# Patient Record
Sex: FEMALE | Race: WHITE | Employment: PART TIME
[De-identification: names, ages, dates, MRNs, and addresses within clinical notes are randomized per-mention and may not be internally consistent; named-entity substitution may affect disease eponyms.]

---
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
## 2023-01-22 RX ORDER — RIFAXIMIN 550 MG/1
TABLET ORAL
COMMUNITY
Start: 2022-11-10

## 2023-01-22 ASSESSMENT — ENCOUNTER SYMPTOMS
NAUSEA: 0
ABDOMINAL PAIN: 0
DIARRHEA: 0
VOMITING: 0
COUGH: 0
SHORTNESS OF BREATH: 0

## 2023-01-22 NOTE — PROGRESS NOTES
[address], DO  [address] [facility], [place] [address], [address]  [phone]         ASSESSMENT AND PLAN    Problem List Items Addressed This Visit          Digestive    Irritable bowel syndrome with both constipation and diarrhea     Patient has seen GI, s/p EGD, takes Dicyclomine as needed for symptoms, states that Rifaximin did not seem to make much of a difference. Sees GI in a few weeks. Will continue to follow. Relevant Medications    dicyclomine (BENTYL) 20 MG tablet       Other    Anxiety - Primary     Patient with worsening anxiety recently, notes that she worries a lot and gets worked up about work and class. Will start on low-dose Escitalopram at 5 mg daily. Discussed possible counseling but patient is not comfortable with that idea right now. Will recheck in 6-8 weeks. Relevant Medications    escitalopram (LEXAPRO) 5 MG tablet        The diagnoses and plan were discussed with the patient, who verbalizes understanding and agrees with plan. All questions answered. Chief Complaint    Chief Complaint   Patient presents with    Follow-up     Anxiety- getting worse  IBS - no changes        HISTORY OF PRESENT ILLNESS    21 y.o. female with IBS presents today to discuss anxiety. Last seen by me five months ago to establish care. At the time had been dealing with months of epigastric pain with nausea and vomiting. Had labs and an ultrasound ordered and was referred to GI. Had an EGD by Dr. Maria Esther Alejandro on November 10, 2022, that was normal.  Advised to take Bentyl as needed and started on Rifaximin to help with IBS. States that she tried it for a couple of weeks but didn't notice much of a difference. States that she has noticed more anxiety recently. Feels like she is always worried. Notes that she gets very nervous about classes. Is also looking for job.   Sometimes feels like it would be good to talk it out but is not comfortable about her feelings. PAST MEDICAL HISTORY    Past Medical History:   Diagnosis Date    Irritable bowel syndrome        PAST SURGICAL HISTORY    History reviewed. No pertinent surgical history. FAMILY HISTORY    History reviewed. No pertinent family history. SOCIAL HISTORY    Social History     Socioeconomic History    Marital status: Single     Spouse name: None    Number of children: None    Years of education: None    Highest education level: None   Tobacco Use    Smoking status: Never     Passive exposure: Never    Smokeless tobacco: Never   Substance and Sexual Activity    Alcohol use: Never    Drug use: Never    Sexual activity: Not Currently     Social Determinants of Health     Financial Resource Strain: Low Risk     Difficulty of Paying Living Expenses: Not hard at all   Food Insecurity: No Food Insecurity    Worried About Running Out of Food in the Last Year: Never true    Ran Out of Food in the Last Year: Never true       MEDICATIONS      Current Outpatient Medications:     dicyclomine (BENTYL) 20 MG tablet, Take 1 tablet by mouth every 6 hours as needed (cramping), Disp: 120 tablet, Rfl: 5    escitalopram (LEXAPRO) 5 MG tablet, Take 1 tablet by mouth daily, Disp: 30 tablet, Rfl: 1    XIFAXAN 550 MG tablet, TAKE 1 TABLET BY MOUTH 3 TIMES EVERY DAY FOR 14 DAYS FOR IBS DIARRHEA., Disp: , Rfl:     dicyclomine (BENTYL) 20 MG tablet, Take 1 tablet by mouth 4 times daily, Disp: 120 tablet, Rfl: 1    famotidine (PEPCID) 20 MG tablet, Take 1 tablet by mouth 2 times daily, Disp: 60 tablet, Rfl: 3    ALLERGIES / INTOLERANCES    Allergies   Allergen Reactions    Amoxicillin Rash       REVIEW OF SYSTEMS    Review of Systems   Constitutional:  Negative for fever. HENT:  Negative for congestion. Respiratory:  Negative for cough and shortness of breath. Cardiovascular:  Negative for chest pain. Gastrointestinal:  Negative for abdominal pain, diarrhea, nausea and vomiting. Psychiatric/Behavioral:  Negative for dysphoric mood. PHYSICAL EXAMINATION    Vitals:    01/23/23 1428   BP: 118/68   Pulse: 60   Resp: 16   SpO2: 98%         Physical Exam  Vitals and nursing note reviewed. Constitutional:       General: She is not in acute distress. Appearance: Normal appearance. HENT:      Head: Normocephalic and atraumatic. Right Ear: External ear normal.      Left Ear: External ear normal.      Nose: Nose normal.   Eyes:      Extraocular Movements: Extraocular movements intact. Pupils: Pupils are equal, round, and reactive to light. Cardiovascular:      Rate and Rhythm: Normal rate and regular rhythm. Heart sounds: Normal heart sounds. No murmur heard. Pulmonary:      Effort: Pulmonary effort is normal. No respiratory distress. Breath sounds: Normal breath sounds. Abdominal:      General: Bowel sounds are normal.      Palpations: Abdomen is soft. Tenderness: There is no abdominal tenderness. There is no right CVA tenderness or left CVA tenderness. Musculoskeletal:         General: Normal range of motion. Cervical back: Normal range of motion. Skin:     General: Skin is warm. Findings: No rash. Neurological:      General: No focal deficit present. Mental Status: She is alert.    Psychiatric:         Mood and Affect: Mood normal.         Behavior: Behavior normal.           RESULTS    Lab Results   Component Value Date     08/16/2022    K 4.1 08/16/2022     08/16/2022    CO2 27 08/16/2022    BUN 6 08/16/2022    CREATININE 0.70 08/16/2022    GLUCOSE 89 08/16/2022    CALCIUM 9.3 08/16/2022    PROT 7.7 08/16/2022    LABALBU 4.1 08/16/2022    BILITOT 1.7 (H) 08/16/2022    ALKPHOS 68 08/16/2022    AST 13 08/16/2022    ALT 26 08/16/2022    LABGLOM >60 08/16/2022    GFRAA >60 08/16/2022    GLOB 3.6 (H) 08/16/2022       Lab Results   Component Value Date    WBC 5.0 08/16/2022    HGB 11.3 (L) 08/16/2022    HCT 36.3 08/16/2022 MCV 83.1 08/16/2022     08/16/2022     Lab Results   Component Value Date    LIPASE 107 08/16/2022         Pam Rush DO  2:52 PM  01/23/23

## 2023-01-23 ENCOUNTER — OFFICE VISIT (OUTPATIENT)
Dept: PRIMARY CARE CLINIC | Facility: CLINIC | Age: 18
End: 2023-01-23
Payer: COMMERCIAL

## 2023-01-23 VITALS
SYSTOLIC BLOOD PRESSURE: 118 MMHG | RESPIRATION RATE: 16 BRPM | BODY MASS INDEX: 23.71 KG/M2 | HEART RATE: 60 BPM | WEIGHT: 142.3 LBS | HEIGHT: 65 IN | OXYGEN SATURATION: 98 % | DIASTOLIC BLOOD PRESSURE: 68 MMHG

## 2023-01-23 DIAGNOSIS — F41.9 ANXIETY: Primary | ICD-10-CM

## 2023-01-23 DIAGNOSIS — K58.2 IRRITABLE BOWEL SYNDROME WITH BOTH CONSTIPATION AND DIARRHEA: ICD-10-CM

## 2023-01-23 PROCEDURE — 99214 OFFICE O/P EST MOD 30 MIN: CPT | Performed by: FAMILY MEDICINE

## 2023-01-23 RX ORDER — ESCITALOPRAM OXALATE 5 MG/1
5 TABLET ORAL DAILY
Qty: 30 TABLET | Refills: 1 | Status: SHIPPED | OUTPATIENT
Start: 2023-01-23

## 2023-01-23 RX ORDER — DICYCLOMINE HCL 20 MG
20 TABLET ORAL EVERY 6 HOURS PRN
Qty: 120 TABLET | Refills: 5 | Status: SHIPPED | OUTPATIENT
Start: 2023-01-23

## 2023-01-23 SDOH — ECONOMIC STABILITY: FOOD INSECURITY: WITHIN THE PAST 12 MONTHS, YOU WORRIED THAT YOUR FOOD WOULD RUN OUT BEFORE YOU GOT MONEY TO BUY MORE.: NEVER TRUE

## 2023-01-23 SDOH — ECONOMIC STABILITY: FOOD INSECURITY: WITHIN THE PAST 12 MONTHS, THE FOOD YOU BOUGHT JUST DIDN'T LAST AND YOU DIDN'T HAVE MONEY TO GET MORE.: NEVER TRUE

## 2023-01-23 ASSESSMENT — ANXIETY QUESTIONNAIRES
2. NOT BEING ABLE TO STOP OR CONTROL WORRYING: 1
IF YOU CHECKED OFF ANY PROBLEMS ON THIS QUESTIONNAIRE, HOW DIFFICULT HAVE THESE PROBLEMS MADE IT FOR YOU TO DO YOUR WORK, TAKE CARE OF THINGS AT HOME, OR GET ALONG WITH OTHER PEOPLE: SOMEWHAT DIFFICULT
GAD7 TOTAL SCORE: 12
6. BECOMING EASILY ANNOYED OR IRRITABLE: 2
5. BEING SO RESTLESS THAT IT IS HARD TO SIT STILL: 2
1. FEELING NERVOUS, ANXIOUS, OR ON EDGE: 3
3. WORRYING TOO MUCH ABOUT DIFFERENT THINGS: 2
7. FEELING AFRAID AS IF SOMETHING AWFUL MIGHT HAPPEN: 1
4. TROUBLE RELAXING: 1

## 2023-01-23 ASSESSMENT — PATIENT HEALTH QUESTIONNAIRE - PHQ9
SUM OF ALL RESPONSES TO PHQ QUESTIONS 1-9: 0
SUM OF ALL RESPONSES TO PHQ QUESTIONS 1-9: 0
1. LITTLE INTEREST OR PLEASURE IN DOING THINGS: 0
SUM OF ALL RESPONSES TO PHQ QUESTIONS 1-9: 0
SUM OF ALL RESPONSES TO PHQ QUESTIONS 1-9: 0

## 2023-01-23 ASSESSMENT — SOCIAL DETERMINANTS OF HEALTH (SDOH): HOW HARD IS IT FOR YOU TO PAY FOR THE VERY BASICS LIKE FOOD, HOUSING, MEDICAL CARE, AND HEATING?: NOT HARD AT ALL

## 2023-01-23 NOTE — ASSESSMENT & PLAN NOTE
Patient has seen GI, s/p EGD, takes Dicyclomine as needed for symptoms, states that Rifaximin did not seem to make much of a difference.  Sees GI in a few weeks.  Will continue to follow.

## 2023-01-23 NOTE — ASSESSMENT & PLAN NOTE
Patient with worsening anxiety recently, notes that she worries a lot and gets worked up about work and class. Will start on low-dose Escitalopram at 5 mg daily. Discussed possible counseling but patient is not comfortable with that idea right now. Will recheck in 6-8 weeks.

## 2023-01-23 NOTE — PATIENT INSTRUCTIONS
IT WAS GREAT TO SEE YOU TODAY! I HAVE SENT A PRESCRIPTION FOR ESCITALOPRAM 5 MG TO THE PHARMACY. START TAKING THIS ONCE A DAY TO HELP WITH ANXIETY. IT WILL TAKE 2-3 WEEKS TO START WORKING, BUT IF YOU HAVE ANY BAD SIDE EFFECTS FROM THE MEDICINE STOP IT AND CALL ME.    KEEP YOUR APPOINTMENT WITH GI TO SEE WHAT ELSE THEY WANT TO DO ABOUT YOUR IBS. I SENT A REFILL OF DICYCLOMINE TO THE PHARMACY TO HELP WITH YOUR IBS SYMPTOMS. STAY HYDRATED!     I WILL SEE YOU IN 6-8 WEEKS BUT CALL WITH CONCERNS [phone]

## 2023-03-12 ASSESSMENT — ENCOUNTER SYMPTOMS
NAUSEA: 0
COUGH: 0
VOMITING: 0
SHORTNESS OF BREATH: 0

## 2023-03-12 NOTE — PROGRESS NOTES
[address], DO  [address] [facility], [place] [address], [address]  [phone]         ASSESSMENT AND PLAN    Problem List Items Addressed This Visit          Digestive    Irritable bowel syndrome with both constipation and diarrhea     Switched to Amitriptyline by GI. Will hold on switching SSRIs as this may help with her situational anxiety as well as her IBS. Other    Anxiety - Primary      Situational, mostly due to speech class. Initially considered switching to Fluoxetine but as patient was switched to Amitriptyline for her IBS, will hold on this and instead have her try Propranolol 30-60 minutes prior to speech class. Will recheck in 3 months but advised to call if her anxiety gets worse prior to this. Relevant Medications    amitriptyline (ELAVIL) 10 MG tablet        The diagnoses and plan were discussed with the patient, who verbalizes understanding and agrees with plan. All questions answered. Chief Complaint    Chief Complaint   Patient presents with    Follow-up     Anxiety - stopped Escitalopram due to feeling out of it         HISTORY OF PRESENT ILLNESS    21 y.o. female with anxiety presents today for follow up. Last seen six weeks ago, was started on low-dose Escitalopram for worsening anxiety and was waiting to see GI for follow up after starting Rifaximin for IBS without much improvement. Asked to return today for recheck. Patient sent a message in the interim discussing concern about being sleepy when she took the medicine during the day but couldn't sleep when she tried the medicine at night. Did not want to change at the time. States that she stopped the Escitalopram due to feeling out of it, states that it made her feel like a zombie. Notes that she saw GI, who stopped the Rifaximin and put her on a new medicine. Just started it a week ago. Still feels anxious due to speech class.   Denies suicidal or homicidal ideation. States that she sleeps some, usually gets five hours per night. Notes that she has a lot of schoolwork that keeps her up. Has 3 exams this week. PAST MEDICAL HISTORY    Past Medical History:   Diagnosis Date    Irritable bowel syndrome        PAST SURGICAL HISTORY    History reviewed. No pertinent surgical history. FAMILY HISTORY    History reviewed. No pertinent family history.     SOCIAL HISTORY    Social History     Socioeconomic History    Marital status: Single     Spouse name: None    Number of children: None    Years of education: None    Highest education level: None   Tobacco Use    Smoking status: Never     Passive exposure: Never    Smokeless tobacco: Never   Substance and Sexual Activity    Alcohol use: Never    Drug use: Never    Sexual activity: Not Currently     Social Determinants of Health     Financial Resource Strain: Low Risk     Difficulty of Paying Living Expenses: Not hard at all   Food Insecurity: No Food Insecurity    Worried About Running Out of Food in the Last Year: Never true    Ran Out of Food in the Last Year: Never true   Transportation Needs: Unknown    Lack of Transportation (Non-Medical): No   Housing Stability: Unknown    Unstable Housing in the Last Year: No       MEDICATIONS      Current Outpatient Medications:     propranolol (INDERAL) 10 MG tablet, Take 1 tablet by mouth daily as needed (anxiety - take 30-60 minutes before speech class), Disp: 30 tablet, Rfl: 1    dicyclomine (BENTYL) 20 MG tablet, Take 1 tablet by mouth every 6 hours as needed (cramping), Disp: 120 tablet, Rfl: 5    famotidine (PEPCID) 20 MG tablet, Take 1 tablet by mouth 2 times daily, Disp: 60 tablet, Rfl: 3    amitriptyline (ELAVIL) 10 MG tablet, TAKE 1 TABLET BY MOUTH EVERYDAY AT BEDTIME, Disp: , Rfl:     ALLERGIES / INTOLERANCES    Allergies   Allergen Reactions    Amoxicillin Rash       REVIEW OF SYSTEMS    Review of Systems   Constitutional:  Negative for fever. HENT:  Negative for congestion. Respiratory:  Negative for cough and shortness of breath. Cardiovascular:  Negative for chest pain. Gastrointestinal:  Positive for abdominal pain, constipation and diarrhea. Negative for nausea and vomiting. Psychiatric/Behavioral:  Negative for dysphoric mood, self-injury and suicidal ideas. The patient is nervous/anxious. PHYSICAL EXAMINATION    There were no vitals filed for this visit. Physical Exam  Vitals and nursing note reviewed. Constitutional:       General: She is not in acute distress. Appearance: Normal appearance. HENT:      Head: Normocephalic and atraumatic. Right Ear: External ear normal.      Left Ear: External ear normal.      Nose: Nose normal.   Eyes:      Extraocular Movements: Extraocular movements intact. Pupils: Pupils are equal, round, and reactive to light. Cardiovascular:      Rate and Rhythm: Normal rate and regular rhythm. Heart sounds: Normal heart sounds. No murmur heard. Pulmonary:      Effort: Pulmonary effort is normal. No respiratory distress. Breath sounds: Normal breath sounds. Abdominal:      General: Bowel sounds are normal.      Palpations: Abdomen is soft. Tenderness: There is no abdominal tenderness. There is no right CVA tenderness or left CVA tenderness. Musculoskeletal:         General: Normal range of motion. Cervical back: Normal range of motion. Skin:     General: Skin is warm. Findings: No rash. Neurological:      General: No focal deficit present. Mental Status: She is alert.    Psychiatric:         Mood and Affect: Mood normal.         Behavior: Behavior normal.           Milo Tinoco DO  10:12 AM  03/13/23

## 2023-03-13 ENCOUNTER — PATIENT MESSAGE (OUTPATIENT)
Dept: PRIMARY CARE CLINIC | Facility: CLINIC | Age: 18
End: 2023-03-13

## 2023-03-13 ENCOUNTER — OFFICE VISIT (OUTPATIENT)
Dept: PRIMARY CARE CLINIC | Facility: CLINIC | Age: 18
End: 2023-03-13
Payer: COMMERCIAL

## 2023-03-13 VITALS
WEIGHT: 148.2 LBS | HEART RATE: 62 BPM | RESPIRATION RATE: 14 BRPM | DIASTOLIC BLOOD PRESSURE: 76 MMHG | OXYGEN SATURATION: 98 % | BODY MASS INDEX: 24.69 KG/M2 | HEIGHT: 65 IN | SYSTOLIC BLOOD PRESSURE: 120 MMHG

## 2023-03-13 DIAGNOSIS — F41.9 ANXIETY: Primary | ICD-10-CM

## 2023-03-13 DIAGNOSIS — K58.2 IRRITABLE BOWEL SYNDROME WITH BOTH CONSTIPATION AND DIARRHEA: ICD-10-CM

## 2023-03-13 PROCEDURE — 99214 OFFICE O/P EST MOD 30 MIN: CPT | Performed by: FAMILY MEDICINE

## 2023-03-13 RX ORDER — PROPRANOLOL HYDROCHLORIDE 10 MG/1
10 TABLET ORAL DAILY PRN
Qty: 30 TABLET | Refills: 1 | Status: SHIPPED | OUTPATIENT
Start: 2023-03-13

## 2023-03-13 RX ORDER — AMITRIPTYLINE HYDROCHLORIDE 10 MG/1
TABLET, FILM COATED ORAL
COMMUNITY
Start: 2023-02-27

## 2023-03-13 SDOH — ECONOMIC STABILITY: FOOD INSECURITY: WITHIN THE PAST 12 MONTHS, YOU WORRIED THAT YOUR FOOD WOULD RUN OUT BEFORE YOU GOT MONEY TO BUY MORE.: NEVER TRUE

## 2023-03-13 SDOH — ECONOMIC STABILITY: INCOME INSECURITY: HOW HARD IS IT FOR YOU TO PAY FOR THE VERY BASICS LIKE FOOD, HOUSING, MEDICAL CARE, AND HEATING?: NOT HARD AT ALL

## 2023-03-13 SDOH — ECONOMIC STABILITY: FOOD INSECURITY: WITHIN THE PAST 12 MONTHS, THE FOOD YOU BOUGHT JUST DIDN'T LAST AND YOU DIDN'T HAVE MONEY TO GET MORE.: NEVER TRUE

## 2023-03-13 SDOH — ECONOMIC STABILITY: HOUSING INSECURITY
IN THE LAST 12 MONTHS, WAS THERE A TIME WHEN YOU DID NOT HAVE A STEADY PLACE TO SLEEP OR SLEPT IN A SHELTER (INCLUDING NOW)?: NO

## 2023-03-13 ASSESSMENT — PATIENT HEALTH QUESTIONNAIRE - PHQ9
SUM OF ALL RESPONSES TO PHQ9 QUESTIONS 1 & 2: 0
SUM OF ALL RESPONSES TO PHQ QUESTIONS 1-9: 0
1. LITTLE INTEREST OR PLEASURE IN DOING THINGS: 0
SUM OF ALL RESPONSES TO PHQ QUESTIONS 1-9: 0
SUM OF ALL RESPONSES TO PHQ QUESTIONS 1-9: 0
2. FEELING DOWN, DEPRESSED OR HOPELESS: 0
SUM OF ALL RESPONSES TO PHQ QUESTIONS 1-9: 0

## 2023-03-13 ASSESSMENT — ANXIETY QUESTIONNAIRES
7. FEELING AFRAID AS IF SOMETHING AWFUL MIGHT HAPPEN: 0
3. WORRYING TOO MUCH ABOUT DIFFERENT THINGS: 0
4. TROUBLE RELAXING: 0
1. FEELING NERVOUS, ANXIOUS, OR ON EDGE: 0
5. BEING SO RESTLESS THAT IT IS HARD TO SIT STILL: 0
IF YOU CHECKED OFF ANY PROBLEMS ON THIS QUESTIONNAIRE, HOW DIFFICULT HAVE THESE PROBLEMS MADE IT FOR YOU TO DO YOUR WORK, TAKE CARE OF THINGS AT HOME, OR GET ALONG WITH OTHER PEOPLE: NOT DIFFICULT AT ALL
6. BECOMING EASILY ANNOYED OR IRRITABLE: 0
2. NOT BEING ABLE TO STOP OR CONTROL WORRYING: 0
GAD7 TOTAL SCORE: 0

## 2023-03-13 ASSESSMENT — ENCOUNTER SYMPTOMS
ABDOMINAL PAIN: 1
CONSTIPATION: 1
DIARRHEA: 1

## 2023-03-13 NOTE — ASSESSMENT & PLAN NOTE
Situational, mostly due to speech class. Initially considered switching to Fluoxetine but as patient was switched to Amitriptyline for her IBS, will hold on this and instead have her try Propranolol 30-60 minutes prior to speech class. Will recheck in 3 months but advised to call if her anxiety gets worse prior to this.

## 2023-03-13 NOTE — PATIENT INSTRUCTIONS
IT WAS GREAT TO SEE YOU TODAY! PLEASE TAKE ALL MEDICATION AS DISCUSSED. CONTINUE TAKING THE AMITRIPTYLINE TO SEE IF THIS HELPS WITH YOUR IBS. IT MAY ALSO HELP A LITTLE WITH ANXIETY. YOU CAN TAKE THE PROPRANOLOL ONCE A DAY IF NEEDED FOR ANXIETY. I WOULD RECOMMEND TAKING IT 30-60 MINUTES BEFORE SPEECH CLASS.   IF YOU HAVE ANY PROBLEMS ON THIS MEDICINE, STOP IT AND CALL ME.    I WILL SEE YOU AGAIN IN 3 MONTHS BUT PLEASE CALL WITH CONCERNS [phone]

## 2023-03-13 NOTE — ASSESSMENT & PLAN NOTE
Switched to Amitriptyline by GI. Will hold on switching SSRIs as this may help with her situational anxiety as well as her IBS.

## 2023-03-27 RX ORDER — AMITRIPTYLINE HYDROCHLORIDE 10 MG/1
10 TABLET, FILM COATED ORAL NIGHTLY
Qty: 90 TABLET | Refills: 1 | Status: SHIPPED | OUTPATIENT
Start: 2023-03-27

## 2023-03-27 NOTE — TELEPHONE ENCOUNTER
From: Dr. Florin Waters  To: Tito Mosley  Sent: 3/13/2023 10:11 AM EDT  Subject: Clinical References    IT WAS GREAT TO SEE YOU TODAY! PLEASE TAKE ALL MEDICATION AS DISCUSSED. CONTINUE TAKING THE AMITRIPTYLINE TO SEE IF THIS HELPS WITH YOUR IBS. IT MAY ALSO HELP A LITTLE WITH ANXIETY. YOU CAN TAKE THE PROPRANOLOL ONCE A DAY IF NEEDED FOR ANXIETY. I WOULD RECOMMEND TAKING IT 30-60 MINUTES BEFORE SPEECH CLASS.  IF YOU HAVE ANY PROBLEMS ON THIS MEDICINE, STOP IT AND CALL ME.    I WILL SEE YOU AGAIN IN 3 MONTHS BUT PLEASE CALL WITH CONCERNS [phone]

## 2023-06-14 PROBLEM — Z91.018 TOMATO ALLERGY: Status: ACTIVE | Noted: 2023-06-14

## 2023-06-14 PROBLEM — R41.840 IMPAIRED CONCENTRATION: Status: ACTIVE | Noted: 2023-06-14

## 2023-07-14 RX ORDER — AMITRIPTYLINE HYDROCHLORIDE 25 MG/1
25 TABLET, FILM COATED ORAL NIGHTLY
Qty: 30 TABLET | Refills: 5 | Status: SHIPPED | OUTPATIENT
Start: 2023-07-14

## 2023-08-17 ENCOUNTER — OFFICE VISIT (OUTPATIENT)
Dept: PRIMARY CARE CLINIC | Facility: CLINIC | Age: 18
End: 2023-08-17
Payer: COMMERCIAL

## 2023-08-17 VITALS
WEIGHT: 144.9 LBS | DIASTOLIC BLOOD PRESSURE: 62 MMHG | RESPIRATION RATE: 16 BRPM | HEART RATE: 68 BPM | BODY MASS INDEX: 24.14 KG/M2 | SYSTOLIC BLOOD PRESSURE: 116 MMHG | HEIGHT: 65 IN | OXYGEN SATURATION: 98 %

## 2023-08-17 DIAGNOSIS — K58.2 IRRITABLE BOWEL SYNDROME WITH BOTH CONSTIPATION AND DIARRHEA: ICD-10-CM

## 2023-08-17 DIAGNOSIS — F90.0 ATTENTION DEFICIT HYPERACTIVITY DISORDER (ADHD), PREDOMINANTLY INATTENTIVE TYPE: Primary | ICD-10-CM

## 2023-08-17 PROCEDURE — 99214 OFFICE O/P EST MOD 30 MIN: CPT | Performed by: FAMILY MEDICINE

## 2023-08-17 RX ORDER — DEXTROAMPHETAMINE SACCHARATE, AMPHETAMINE ASPARTATE MONOHYDRATE, DEXTROAMPHETAMINE SULFATE AND AMPHETAMINE SULFATE 2.5; 2.5; 2.5; 2.5 MG/1; MG/1; MG/1; MG/1
10 CAPSULE, EXTENDED RELEASE ORAL DAILY
Qty: 31 CAPSULE | Refills: 0 | Status: SHIPPED | OUTPATIENT
Start: 2023-08-17 | End: 2023-09-17

## 2023-08-17 ASSESSMENT — ENCOUNTER SYMPTOMS
VOMITING: 0
NAUSEA: 0
ABDOMINAL PAIN: 0
DIARRHEA: 0
SHORTNESS OF BREATH: 0
COUGH: 0

## 2023-08-17 ASSESSMENT — PATIENT HEALTH QUESTIONNAIRE - PHQ9
SUM OF ALL RESPONSES TO PHQ QUESTIONS 1-9: 1
2. FEELING DOWN, DEPRESSED OR HOPELESS: SEVERAL DAYS
SUM OF ALL RESPONSES TO PHQ9 QUESTIONS 1 & 2: 1
2. FEELING DOWN, DEPRESSED OR HOPELESS: 1
1. LITTLE INTEREST OR PLEASURE IN DOING THINGS: 0
SUM OF ALL RESPONSES TO PHQ QUESTIONS 1-9: 1
SUM OF ALL RESPONSES TO PHQ QUESTIONS 1-9: 1
SUM OF ALL RESPONSES TO PHQ9 QUESTIONS 1 & 2: 1
SUM OF ALL RESPONSES TO PHQ QUESTIONS 1-9: 1
1. LITTLE INTEREST OR PLEASURE IN DOING THINGS: NOT AT ALL

## 2023-08-17 NOTE — PATIENT INSTRUCTIONS
IT WAS GREAT TO SEE YOU TODAY! PLEASE TAKE ALL MEDICATION AS DISCUSSED.    ~CONTINUE THE AMITRIPTYLINE AND DICYCLOMINE FOR YOUR STOMACH. KEEP TAKING THE PROBIOTIC.    ~I AM SENDING ADDERALL XR 10 MG TO THE PHARMACY FOR YOU TO TAKE IN THE MORNING WITH BREAKFAST. DO NOT TAKE IT TOO LATE IN THE DAY AS IT MAY KEEP YOU AWAKE. LET ME KNOW IF IT CAUSES ANY DIZZINESS, HEADACHES OR PALPITATIONS.     I WILL SEE YOU AGAIN IN 4 WEEKS BUT PLEASE CALL WITH CONCERNS [phone]

## 2023-08-17 NOTE — ASSESSMENT & PLAN NOTE
No improvement in concentration since increasing Amitriptyline. Discussed starting on low dose Adderall XR 10 mg to see if this helps with her focus. Discussed side effects of the medication, taking it in the morning with food, and making sure it does not affect her sleep or appetite.

## 2023-08-17 NOTE — ASSESSMENT & PLAN NOTE
Improved since increasing her Amitriptyline and adding a probiotic. Still using Dicyclomine as needed. Will continue to monitor.

## 2023-09-22 ENCOUNTER — TELEMEDICINE (OUTPATIENT)
Dept: PRIMARY CARE CLINIC | Facility: CLINIC | Age: 18
End: 2023-09-22
Payer: COMMERCIAL

## 2023-09-22 ENCOUNTER — PATIENT MESSAGE (OUTPATIENT)
Dept: PRIMARY CARE CLINIC | Facility: CLINIC | Age: 18
End: 2023-09-22

## 2023-09-22 DIAGNOSIS — F90.0 ATTENTION DEFICIT HYPERACTIVITY DISORDER (ADHD), PREDOMINANTLY INATTENTIVE TYPE: ICD-10-CM

## 2023-09-22 DIAGNOSIS — F90.0 ATTENTION DEFICIT HYPERACTIVITY DISORDER (ADHD), PREDOMINANTLY INATTENTIVE TYPE: Primary | ICD-10-CM

## 2023-09-22 PROCEDURE — 99214 OFFICE O/P EST MOD 30 MIN: CPT | Performed by: FAMILY MEDICINE

## 2023-09-22 RX ORDER — DEXTROAMPHETAMINE SACCHARATE, AMPHETAMINE ASPARTATE MONOHYDRATE, DEXTROAMPHETAMINE SULFATE AND AMPHETAMINE SULFATE 5; 5; 5; 5 MG/1; MG/1; MG/1; MG/1
20 CAPSULE, EXTENDED RELEASE ORAL EVERY MORNING
Qty: 31 CAPSULE | Refills: 0 | Status: SHIPPED | OUTPATIENT
Start: 2023-09-22 | End: 2023-10-23

## 2023-09-22 ASSESSMENT — ENCOUNTER SYMPTOMS
DIARRHEA: 0
COUGH: 0
SHORTNESS OF BREATH: 0
ABDOMINAL PAIN: 0
VOMITING: 0
NAUSEA: 0

## 2023-09-22 NOTE — PATIENT INSTRUCTIONS
IT WAS GREAT TO SEE YOU TODAY! PLEASE TAKE ALL MEDICATION AS DISCUSSED.    ~WE ARE INCREASING YOUR DOSE OF ADDERALL TO 20 MG DAILY. TAKE IT IN THE MORNING WITH FOOD. YOU DO NOT HAVE TO TAKE IT ON WEEKENDS IF YOU DO NOT HAVE TO DO ANY WORK.     I WILL SEE YOU AGAIN IN 4 WEEKS BUT PLEASE CALL WITH CONCERNS [phone]

## 2023-09-25 RX ORDER — DEXTROAMPHETAMINE SACCHARATE, AMPHETAMINE ASPARTATE MONOHYDRATE, DEXTROAMPHETAMINE SULFATE AND AMPHETAMINE SULFATE 5; 5; 5; 5 MG/1; MG/1; MG/1; MG/1
20 CAPSULE, EXTENDED RELEASE ORAL EVERY MORNING
Qty: 31 CAPSULE | Refills: 0 | Status: SHIPPED | OUTPATIENT
Start: 2023-09-25 | End: 2023-10-26

## 2023-09-25 NOTE — TELEPHONE ENCOUNTER
From: Dr. Jany Monk  To: Dali Stern  Sent: 9/22/2023 11:48 AM EDT  Subject: Clinical References    IT WAS GREAT TO SEE YOU TODAY! PLEASE TAKE ALL MEDICATION AS DISCUSSED.    ~WE ARE INCREASING YOUR DOSE OF ADDERALL TO 20 MG DAILY. TAKE IT IN THE MORNING WITH FOOD. YOU DO NOT HAVE TO TAKE IT ON WEEKENDS IF YOU DO NOT HAVE TO DO ANY WORK.     I WILL SEE YOU AGAIN IN 4 WEEKS BUT PLEASE CALL WITH CONCERNS [phone]

## 2023-10-02 ENCOUNTER — TELEPHONE (OUTPATIENT)
Dept: PRIMARY CARE CLINIC | Facility: CLINIC | Age: 18
End: 2023-10-02

## 2023-10-02 NOTE — TELEPHONE ENCOUNTER
----- Message from Shanelle Eng sent at 10/2/2023  4:10 PM EDT -----  Subject: Appointment Request    Reason for Call: Established Patient Appointment needed: Routine ED Follow   Up Visit    QUESTIONS    Reason for appointment request? No appointments available during search     Additional Information for Provider? Patient was in the ED on Saturday   Night.  Patient needs a Follow up appointment.   ---------------------------------------------------------------------------  --------------  [address]  [phone]; OK to leave message on voicemail  ---------------------------------------------------------------------------  --------------  SCRIPT ANSWERS

## 2023-10-02 NOTE — TELEPHONE ENCOUNTER
----- Message from Zahida Grover sent at 10/2/2023  4:10 PM EDT -----  Subject: Appointment Request    Reason for Call: Established Patient Appointment needed: Routine ED Follow   Up Visit    QUESTIONS    Reason for appointment request? No appointments available during search     Additional Information for Provider? Patient was in the ED on Saturday   Night.  Patient needs a Follow up appointment.   ---------------------------------------------------------------------------  --------------  [address] [place]  [phone]; OK to leave message on voicemail  ---------------------------------------------------------------------------  --------------  SCRIPT ANSWERS

## 2023-10-05 ENCOUNTER — TELEMEDICINE (OUTPATIENT)
Dept: PRIMARY CARE CLINIC | Facility: CLINIC | Age: 18
End: 2023-10-05
Payer: COMMERCIAL

## 2023-10-05 DIAGNOSIS — I88.0 MESENTERIC ADENITIS: ICD-10-CM

## 2023-10-05 DIAGNOSIS — A08.4 VIRAL GASTROENTERITIS: Primary | ICD-10-CM

## 2023-10-05 PROCEDURE — 99213 OFFICE O/P EST LOW 20 MIN: CPT | Performed by: FAMILY MEDICINE

## 2023-10-05 RX ORDER — PROMETHAZINE HYDROCHLORIDE 25 MG/1
TABLET ORAL
COMMUNITY
Start: 2023-09-30

## 2023-10-05 RX ORDER — AZITHROMYCIN 250 MG/1
TABLET, FILM COATED ORAL
COMMUNITY
Start: 2023-09-30

## 2023-10-05 RX ORDER — HYOSCYAMINE SULFATE 0.125 MG
125 TABLET ORAL EVERY 6 HOURS PRN
Qty: 90 TABLET | Refills: 0 | Status: SHIPPED | OUTPATIENT
Start: 2023-10-05

## 2023-10-05 ASSESSMENT — ENCOUNTER SYMPTOMS
COUGH: 0
NAUSEA: 1
ABDOMINAL PAIN: 1
SHORTNESS OF BREATH: 0
VOMITING: 0
BLOOD IN STOOL: 0
DIARRHEA: 1

## 2023-10-05 NOTE — PROGRESS NOTES
DO Geraldo Sims, [address], [address] [facility]  [phone]        Dunia Rodriguez is a 19 y.o. female who was seen by synchronous (real-time) audio-video technology on 10/5/2023. ASSESSMENT & PLAN:    Problem List Items Addressed This Visit          Digestive    Viral gastroenteritis - Primary     Nausea improving with Phenergan, still some diarrhea. Will add Hyoscyamine to help with cramping and diarrhea. Encouraged rest this weekend, bland diet and will follow up in a couple of weeks. Immune and Lymphatic    Mesenteric adenitis     Has one more day of antibiotics, will finish the pack tomorrow. Encouraged to stay hydrated and to rest this weekend. The diagnoses and plan were discussed with the patient, who verbalizes understanding and agrees with plan. All questions answered. Chief Complaint    Chief Complaint   Patient presents with    Follow-up       HISTORY OF PRESENT ILLNESS    19 y.o. female with ADHD presents today for virtual appointment for ER follow up. Last seen by me two weeks ago for uncontrolled ADHD, increased her Adderall XR to 20 mg. States that she started developing dizziness and nausea a week ago [place], states that she was seeing black dots and had muscle spasms, so went to the [employer] ER (pt is a student at [employer]) and had labs and an abdominal CT. Told that she had swollen lymph nodes in her abdomen so was treated with Azithromycin and Promethazine. Was starting to feel better until last night when she felt pain across her abdomen. Had to work at 4:00 PM so ate a small lunch but had pain before she ate dinner so didn't eat. Feels a little better this morning. Notes slight nausea, still with diarrhea. Denies fever. Denies urinary symptoms.     PAST MEDICAL HISTORY    Past Medical History:   Diagnosis Date    Irritable bowel syndrome

## 2023-10-05 NOTE — ASSESSMENT & PLAN NOTE
Nausea improving with Phenergan, still some diarrhea. Will add Hyoscyamine to help with cramping and diarrhea. Encouraged rest this weekend, bland diet and will follow up in a couple of weeks.

## 2023-10-05 NOTE — PATIENT INSTRUCTIONS
IT WAS GREAT TO SEE YOU TODAY! PLEASE TAKE ALL MEDICATION AS DISCUSSED. ~TAKE THE HYOSCYAMINE TO HELP WITH CRAMPING. IT WILL ALSO SLOW DOWN YOUR DIARRHEA.    ~FINISH YOUR AZITHROMYCIN AS WRITTEN.    ~USE THE PROMETHAZINE TO HELP WITH NAUSEA.     I WILL SEE YOU AGAIN IN 2 WEEKS BUT PLEASE CALL WITH CONCERNS [phone]

## 2023-10-05 NOTE — ASSESSMENT & PLAN NOTE
Has one more day of antibiotics, will finish the pack tomorrow. Encouraged to stay hydrated and to rest this weekend.

## 2023-10-24 ENCOUNTER — TELEMEDICINE (OUTPATIENT)
Dept: PRIMARY CARE CLINIC | Facility: CLINIC | Age: 18
End: 2023-10-24
Payer: COMMERCIAL

## 2023-10-24 DIAGNOSIS — F90.0 ATTENTION DEFICIT HYPERACTIVITY DISORDER (ADHD), PREDOMINANTLY INATTENTIVE TYPE: ICD-10-CM

## 2023-10-24 DIAGNOSIS — I88.0 MESENTERIC ADENITIS: ICD-10-CM

## 2023-10-24 DIAGNOSIS — I88.0 NONSPECIFIC MESENTERIC ADENITIS: Primary | ICD-10-CM

## 2023-10-24 DIAGNOSIS — K58.2 IRRITABLE BOWEL SYNDROME WITH BOTH CONSTIPATION AND DIARRHEA: ICD-10-CM

## 2023-10-24 PROCEDURE — 99214 OFFICE O/P EST MOD 30 MIN: CPT | Performed by: FAMILY MEDICINE

## 2023-10-24 RX ORDER — AMITRIPTYLINE HYDROCHLORIDE 50 MG/1
50 TABLET, FILM COATED ORAL NIGHTLY
Qty: 90 TABLET | Refills: 1 | Status: SHIPPED | OUTPATIENT
Start: 2023-10-24

## 2023-10-24 RX ORDER — METHYLPHENIDATE HYDROCHLORIDE 10 MG/1
10 CAPSULE, EXTENDED RELEASE ORAL DAILY
Qty: 31 CAPSULE | Refills: 0 | Status: SHIPPED | OUTPATIENT
Start: 2023-10-24 | End: 2023-11-24

## 2023-10-24 ASSESSMENT — PATIENT HEALTH QUESTIONNAIRE - PHQ9
4. FEELING TIRED OR HAVING LITTLE ENERGY: 2
SUM OF ALL RESPONSES TO PHQ QUESTIONS 1-9: 11
1. LITTLE INTEREST OR PLEASURE IN DOING THINGS: 2
SUM OF ALL RESPONSES TO PHQ QUESTIONS 1-9: 11
SUM OF ALL RESPONSES TO PHQ QUESTIONS 1-9: 11
2. FEELING DOWN, DEPRESSED OR HOPELESS: 2
SUM OF ALL RESPONSES TO PHQ QUESTIONS 1-9: 11
5. POOR APPETITE OR OVEREATING: 0
8. MOVING OR SPEAKING SO SLOWLY THAT OTHER PEOPLE COULD HAVE NOTICED. OR THE OPPOSITE, BEING SO FIGETY OR RESTLESS THAT YOU HAVE BEEN MOVING AROUND A LOT MORE THAN USUAL: 1
3. TROUBLE FALLING OR STAYING ASLEEP: 0
10. IF YOU CHECKED OFF ANY PROBLEMS, HOW DIFFICULT HAVE THESE PROBLEMS MADE IT FOR YOU TO DO YOUR WORK, TAKE CARE OF THINGS AT HOME, OR GET ALONG WITH OTHER PEOPLE: 1
7. TROUBLE CONCENTRATING ON THINGS, SUCH AS READING THE NEWSPAPER OR WATCHING TELEVISION: 3
6. FEELING BAD ABOUT YOURSELF - OR THAT YOU ARE A FAILURE OR HAVE LET YOURSELF OR YOUR FAMILY DOWN: 1
SUM OF ALL RESPONSES TO PHQ9 QUESTIONS 1 & 2: 4
9. THOUGHTS THAT YOU WOULD BE BETTER OFF DEAD, OR OF HURTING YOURSELF: 0

## 2023-10-24 ASSESSMENT — ENCOUNTER SYMPTOMS
NAUSEA: 1
SHORTNESS OF BREATH: 0
VOMITING: 0
ABDOMINAL PAIN: 1
DIARRHEA: 1
COUGH: 0

## 2023-10-24 NOTE — ASSESSMENT & PLAN NOTE
Did not tolerate Adderall, developed significant nausea, vomiting, cramping and abdominal pain with diarrhea. Stopped Adderall, will switch to methylphenidate to see if patient tolerates this as this was the class of medicine her mother is on. Recheck in 4 weeks.

## 2023-10-24 NOTE — PATIENT INSTRUCTIONS
IT WAS GREAT TO SEE YOU TODAY! I HAVE ORDERED A FOLLOW UP CT SCAN BUT WANT TO WAIT A COUPLE OF WEEKS BEFORE WE SCHEDULE IT TO MAKE SURE YOUR STOMACH IS OK FROM THE NEW MEDICATIONS. PLEASE TAKE ALL MEDICATION AS DISCUSSED.    ~WE ARE INCREASING YOUR AMITRIPTYLINE AT NIGHT TO HELP WITH YOUR STOMACH. TAKE TWO OF THE 25 MG PILLS AT BEDTIME UNTIL YOU RUN OUT. I WILL SEND A NEW PRESCRIPTION FOR 50 MG PILLS TO THE PHARMACY.    ~WE WILL TRY YOU ON 10 MG OF METHYLPHENIDATE EVERY MORNING WITH BREAKFAST FOR ADHD. STOP IT AND LET ME KNOW IF IT CAUSES ANY SIDE EFFECTS. DRINK LOTS OF WATER!     I WILL SEE YOU AGAIN IN 4 WEEKS BUT PLEASE CALL WITH CONCERNS [phone]

## 2023-10-24 NOTE — PROGRESS NOTES
release Adderall. Patient developed nausea, vomiting, abdominal cramping and diarrhea. Went to the ER, had a CT scan that showed swollen lymph nodes and was diagnosed with gastroenteritis. Symptoms improved until she restarted her Adderall. Stopped taking the Adderall but is still having difficulty with focus and concentration. States that since she tried the Adderall her IBS has been worse. Notes that she is doing well on the dicyclomine to help with cramping but still has discomfort and diarrhea. Still taking 25 mg of amitriptyline at night. Denies fever or urinary symptoms. States that her mother is concerned due to the swollen lymph nodes noted on the CT scan in the ER. PAST MEDICAL HISTORY    Past Medical History:   Diagnosis Date    Irritable bowel syndrome        PAST SURGICAL HISTORY    History reviewed. No pertinent surgical history. FAMILY HISTORY    History reviewed. No pertinent family history.     SOCIAL HISTORY    Social History     Socioeconomic History    Marital status: Single     Spouse name: None    Number of children: None    Years of education: None    Highest education level: None   Tobacco Use    Smoking status: Never     Passive exposure: Never    Smokeless tobacco: Never   Substance and Sexual Activity    Alcohol use: Never    Drug use: Never    Sexual activity: Not Currently     Social Determinants of Health     Financial Resource Strain: Low Risk  (6/14/2023)    Overall Financial Resource Strain (CARDIA)     Difficulty of Paying Living Expenses: Not hard at all   Food Insecurity: No Food Insecurity (6/14/2023)    Hunger Vital Sign     Worried About Running Out of Food in the Last Year: Never true     Ran Out of Food in the Last Year: Never true   Transportation Needs: Unknown (6/14/2023)    PRAPARE - Transportation     Lack of Transportation (Non-Medical): No   Housing Stability: Unknown (6/14/2023)    Housing Stability Vital Sign     Unstable Housing in the Last Year: No

## 2023-10-24 NOTE — ASSESSMENT & PLAN NOTE
Found on CT scan 3 weeks ago at the end med ER when patient went in for gastroenteritis. Patient with persistent symptoms, mother concerned about worsening adenitis. Plan to recheck CT scan in a couple of weeks pending patient's response to her new medications.

## 2023-10-24 NOTE — ASSESSMENT & PLAN NOTE
Worsening symptoms since trying the Adderall, notes cramping and diarrhea. States that the dicyclomine is helping. Will increase amitriptyline to 50 mg at bedtime. Plan to recheck in 4 weeks.

## 2023-10-25 ENCOUNTER — TELEPHONE (OUTPATIENT)
Dept: FAMILY MEDICINE CLINIC | Facility: CLINIC | Age: 18
End: 2023-10-25

## 2023-11-21 ENCOUNTER — TELEMEDICINE (OUTPATIENT)
Dept: PRIMARY CARE CLINIC | Facility: CLINIC | Age: 18
End: 2023-11-21
Payer: COMMERCIAL

## 2023-11-21 DIAGNOSIS — R53.83 FATIGUE, UNSPECIFIED TYPE: ICD-10-CM

## 2023-11-21 DIAGNOSIS — K58.2 IRRITABLE BOWEL SYNDROME WITH BOTH CONSTIPATION AND DIARRHEA: ICD-10-CM

## 2023-11-21 DIAGNOSIS — F90.0 ATTENTION DEFICIT HYPERACTIVITY DISORDER (ADHD), PREDOMINANTLY INATTENTIVE TYPE: Primary | ICD-10-CM

## 2023-11-21 PROCEDURE — 99214 OFFICE O/P EST MOD 30 MIN: CPT | Performed by: FAMILY MEDICINE

## 2023-11-21 RX ORDER — AMITRIPTYLINE HYDROCHLORIDE 25 MG/1
25 TABLET, FILM COATED ORAL NIGHTLY
Qty: 90 TABLET | Refills: 1 | Status: SHIPPED | OUTPATIENT
Start: 2023-11-21

## 2023-11-21 RX ORDER — ONDANSETRON 4 MG/1
4 TABLET, ORALLY DISINTEGRATING ORAL PRN
COMMUNITY
Start: 2023-11-17 | End: 2023-11-21

## 2023-11-21 RX ORDER — ATOMOXETINE 10 MG/1
10 CAPSULE ORAL DAILY
Qty: 30 CAPSULE | Refills: 2 | Status: SHIPPED | OUTPATIENT
Start: 2024-01-20 | End: 2024-04-19

## 2023-11-21 RX ORDER — ONDANSETRON 4 MG/1
4 TABLET, ORALLY DISINTEGRATING ORAL 3 TIMES DAILY PRN
Qty: 21 TABLET | Refills: 0 | Status: SHIPPED | OUTPATIENT
Start: 2023-11-21

## 2023-11-21 ASSESSMENT — ENCOUNTER SYMPTOMS
DIARRHEA: 1
COUGH: 0
NAUSEA: 1
SHORTNESS OF BREATH: 0
VOMITING: 0
ABDOMINAL PAIN: 1

## 2023-11-21 ASSESSMENT — PATIENT HEALTH QUESTIONNAIRE - PHQ9
SUM OF ALL RESPONSES TO PHQ QUESTIONS 1-9: 0
SUM OF ALL RESPONSES TO PHQ QUESTIONS 1-9: 0
SUM OF ALL RESPONSES TO PHQ9 QUESTIONS 1 & 2: 0
2. FEELING DOWN, DEPRESSED OR HOPELESS: 0
SUM OF ALL RESPONSES TO PHQ QUESTIONS 1-9: 0
SUM OF ALL RESPONSES TO PHQ QUESTIONS 1-9: 0
1. LITTLE INTEREST OR PLEASURE IN DOING THINGS: 0

## 2023-11-22 NOTE — PROGRESS NOTES
DO Geraldo Sims, [address], [address] [facility]  [phone]        Ray Parham is a 19 y.o. female who was seen by synchronous (real-time) audio-video technology on 11/21/2023. ASSESSMENT & PLAN:    Problem List Items Addressed This Visit          Digestive    Irritable bowel syndrome with both constipation and diarrhea     Patient with more abdominal symptoms since starting stimulants for ADHD. Symptoms have not quite resolved but are better since stopping the Methylphenidate. Will prescribe Zofran to help with nausea. Relevant Medications    ondansetron (ZOFRAN-ODT) 4 MG disintegrating tablet       Other    Attention deficit hyperactivity disorder (ADHD), predominantly inattentive type - Primary     Still with issues concentrating, had worsening abdominal symptoms on Adderall and Methylphenidate. Will try on Strattera to see if that helps with focus. If not consider Wellbutrin or Psychiatry referral.         Fatigue      Increased since patient has increased Amitriptyline for abdominal symptoms, which I now believe are due to her stimulants. Will cut the dose back to 25 mg and will recheck in a couple of months. The diagnoses and plan were discussed with the patient, who verbalizes understanding and agrees with plan. All questions answered. Chief Complaint    Chief Complaint   Patient presents with    Follow-up     1 month follow up       HISTORY OF PRESENT ILLNESS    19 y.o. female with ADHD presents today for virtual appointment for follow up. Patient states that she had to stop the Methylphenidate due to worsening abdominal symptoms of nausea, cramping and diarrhea. States that her abdominal symptoms are a little better but not a lot better. States that she has also had a lot more fatigue since we increased the Amitriptyline.   States that she is always tired and wonders if we can decrease the dose

## 2023-11-22 NOTE — PATIENT INSTRUCTIONS
IT WAS GREAT TO SEE YOU TODAY! PLEASE TAKE ALL MEDICATION AS DISCUSSED.    ~WE ARE DECREASING YOUR DOSE OF AMITRIPTYLINE TO 25 MG AT NIGHT. CUT YOUR 50 MG PILL IN HALF UNTIL YOU RUN OUT, THEN FILL THE NEW PRESCRIPTION.    ~WE ARE STARTING YOU ON STRATTERA TO HELP WITH ADHD. TAKE IT AFTER THANKSGIVING TO SEE IF THIS HELPS WITH FOCUS. STOP IT AND LET ME KNOW IF YOU HAVE WORSENING ABDOMINAL SYMPTOMS.     I WILL SEE YOU AGAIN IN 2 MONTHS BUT PLEASE CALL WITH CONCERNS [phone]

## 2023-11-22 NOTE — ASSESSMENT & PLAN NOTE
Increased since patient has increased Amitriptyline for abdominal symptoms, which I now believe are due to her stimulants. Will cut the dose back to 25 mg and will recheck in a couple of months.

## 2023-11-22 NOTE — ASSESSMENT & PLAN NOTE
Patient with more abdominal symptoms since starting stimulants for ADHD. Symptoms have not quite resolved but are better since stopping the Methylphenidate. Will prescribe Zofran to help with nausea.

## 2023-11-22 NOTE — ASSESSMENT & PLAN NOTE
Still with issues concentrating, had worsening abdominal symptoms on Adderall and Methylphenidate. Will try on Strattera to see if that helps with focus.   If not consider Wellbutrin or Psychiatry referral.

## 2023-11-27 ENCOUNTER — TELEPHONE (OUTPATIENT)
Dept: FAMILY MEDICINE CLINIC | Facility: CLINIC | Age: 18
End: 2023-11-27

## 2023-12-10 ENCOUNTER — PATIENT MESSAGE (OUTPATIENT)
Dept: PRIMARY CARE CLINIC | Facility: CLINIC | Age: 18
End: 2023-12-10

## 2023-12-12 RX ORDER — ONDANSETRON 4 MG/1
4 TABLET, ORALLY DISINTEGRATING ORAL 3 TIMES DAILY PRN
Qty: 21 TABLET | Refills: 0 | Status: SHIPPED | OUTPATIENT
Start: 2023-12-12

## 2023-12-14 RX ORDER — SCOLOPAMINE TRANSDERMAL SYSTEM 1 MG/1
1 PATCH, EXTENDED RELEASE TRANSDERMAL
Qty: 4 PATCH | Refills: 0 | Status: SHIPPED | OUTPATIENT
Start: 2023-12-14

## 2024-02-06 ENCOUNTER — TELEPHONE (OUTPATIENT)
Dept: PRIMARY CARE CLINIC | Facility: CLINIC | Age: 19
End: 2024-02-06

## 2024-02-06 ENCOUNTER — TELEMEDICINE (OUTPATIENT)
Dept: PRIMARY CARE CLINIC | Facility: CLINIC | Age: 19
End: 2024-02-06
Payer: COMMERCIAL

## 2024-02-06 DIAGNOSIS — F90.0 ATTENTION DEFICIT HYPERACTIVITY DISORDER (ADHD), PREDOMINANTLY INATTENTIVE TYPE: Primary | ICD-10-CM

## 2024-02-06 PROCEDURE — 99214 OFFICE O/P EST MOD 30 MIN: CPT | Performed by: FAMILY MEDICINE

## 2024-02-06 RX ORDER — BUPROPION HYDROCHLORIDE 150 MG/1
150 TABLET ORAL EVERY MORNING
Qty: 30 TABLET | Refills: 3 | Status: SHIPPED | OUTPATIENT
Start: 2024-02-06

## 2024-02-06 ASSESSMENT — ENCOUNTER SYMPTOMS
NAUSEA: 0
VOMITING: 0
DIARRHEA: 0
COUGH: 0
SHORTNESS OF BREATH: 0
ABDOMINAL PAIN: 0

## 2024-02-06 ASSESSMENT — PATIENT HEALTH QUESTIONNAIRE - PHQ9
1. LITTLE INTEREST OR PLEASURE IN DOING THINGS: 0
DEPRESSION UNABLE TO ASSESS: PT REFUSES
SUM OF ALL RESPONSES TO PHQ QUESTIONS 1-9: 0
SUM OF ALL RESPONSES TO PHQ QUESTIONS 1-9: 0
SUM OF ALL RESPONSES TO PHQ9 QUESTIONS 1 & 2: 0
SUM OF ALL RESPONSES TO PHQ QUESTIONS 1-9: 0
2. FEELING DOWN, DEPRESSED OR HOPELESS: 0
SUM OF ALL RESPONSES TO PHQ QUESTIONS 1-9: 0

## 2024-02-06 NOTE — PATIENT INSTRUCTIONS
IT WAS GREAT TO SEE YOU TODAY!    I HAVE REFERRED YOU TO [facility], THEY WILL CONTACT YOU WITH AN APPOINTMENT.    PLEASE TAKE ALL MEDICATION AS DISCUSSED.    ~STOP THE STRATTERA.    ~START THE WELLBUTRIN 150 MG ONCE IN THE MORNING TO HELP WITH FOCUS.    I WILL SEE YOU AGAIN IN 2 MONTHS BUT PLEASE CALL WITH CONCERNS [phone]

## 2024-02-06 NOTE — ASSESSMENT & PLAN NOTE
No improvement with Strattera, has had issues with multiple stimulants, as well.  Will try Wellbutrin to see how she does.  Recheck in 2 months.  Will refer to ADHD specialist in case this does not help.

## 2024-02-06 NOTE — PROGRESS NOTES
Passive exposure: Never    Smokeless tobacco: Never   Substance and Sexual Activity    Alcohol use: Never    Drug use: Never    Sexual activity: Not Currently     Social Determinants of Health     Financial Resource Strain: Low Risk  (6/14/2023)    Overall Financial Resource Strain (CARDIA)     Difficulty of Paying Living Expenses: Not hard at all   Transportation Needs: Unknown (6/14/2023)    PRAPARE - Transportation     Lack of Transportation (Non-Medical): No   Housing Stability: Unknown (6/14/2023)    Housing Stability Vital Sign     Unstable Housing in the Last Year: No       MEDICATIONS      Current Outpatient Medications:     buPROPion (WELLBUTRIN XL) 150 MG extended release tablet, Take 1 tablet by mouth every morning, Disp: 30 tablet, Rfl: 3    scopolamine (TRANSDERM-SCOP) transdermal patch, Place 1 patch onto the skin every 72 hours Remove one patch before placing a second patch, Disp: 4 patch, Rfl: 0    amitriptyline (ELAVIL) 25 MG tablet, Take 1 tablet by mouth nightly, Disp: 90 tablet, Rfl: 1    hyoscyamine (ANASPAZ;LEVSIN) 125 MCG tablet, Take 1 tablet by mouth every 6 hours as needed for Cramping, Disp: 90 tablet, Rfl: 0    dicyclomine (BENTYL) 20 MG tablet, Take 2 tablets by mouth every 8 hours as needed (cramping), Disp: 120 tablet, Rfl: 5    ondansetron (ZOFRAN-ODT) 4 MG disintegrating tablet, Take 1 tablet by mouth 3 times daily as needed for Nausea or Vomiting (Patient not taking: Reported on 2/6/2024), Disp: 21 tablet, Rfl: 0    ALLERGIES / INTOLERANCES    Allergies   Allergen Reactions    Amoxicillin Rash    Tomato Nausea And Vomiting       REVIEW OF SYSTEMS    Review of Systems   Constitutional:  Negative for fever.   HENT:  Negative for congestion.    Respiratory:  Negative for cough and shortness of breath.    Cardiovascular:  Negative for chest pain.   Gastrointestinal:  Negative for abdominal pain, diarrhea, nausea and vomiting.   Neurological:  Positive for dizziness.

## 2024-02-06 NOTE — TELEPHONE ENCOUNTER
----- Message from Angel Franko sent at 2/6/2024  2:50 PM EST -----  Subject: Message to Provider    QUESTIONS  Information for Provider? Patient was calling to see if she can get a   doctors note for her appointment she had today she would like it to be   sent through [employer], patient would like a call back once sent.  ---------------------------------------------------------------------------  --------------  CALL BACK INFO  [phone]; OK to leave message on voicemail,OK to respond with electronic   message via [employer] portal (only for patients who have registered [employer]   account)  ---------------------------------------------------------------------------  --------------  SCRIPT ANSWERS  Relationship to Patient? Self

## 2024-03-04 RX ORDER — ONDANSETRON 4 MG/1
4 TABLET, ORALLY DISINTEGRATING ORAL 3 TIMES DAILY PRN
Qty: 21 TABLET | Refills: 0 | Status: SHIPPED | OUTPATIENT
Start: 2024-03-04

## 2024-03-10 RX ORDER — BUPROPION HYDROCHLORIDE 150 MG/1
150 TABLET ORAL EVERY MORNING
Qty: 30 TABLET | Refills: 3 | Status: SHIPPED | OUTPATIENT
Start: 2024-03-10

## 2024-03-22 RX ORDER — ONDANSETRON 4 MG/1
4 TABLET, ORALLY DISINTEGRATING ORAL 3 TIMES DAILY PRN
Qty: 21 TABLET | Refills: 0 | Status: SHIPPED | OUTPATIENT
Start: 2024-03-22

## 2024-04-09 ENCOUNTER — TELEMEDICINE (OUTPATIENT)
Dept: PRIMARY CARE CLINIC | Facility: CLINIC | Age: 19
End: 2024-04-09
Payer: COMMERCIAL

## 2024-04-09 DIAGNOSIS — F90.0 ATTENTION DEFICIT HYPERACTIVITY DISORDER (ADHD), PREDOMINANTLY INATTENTIVE TYPE: ICD-10-CM

## 2024-04-09 DIAGNOSIS — R42 DIZZINESS: Primary | ICD-10-CM

## 2024-04-09 PROCEDURE — 99213 OFFICE O/P EST LOW 20 MIN: CPT | Performed by: FAMILY MEDICINE

## 2024-04-09 ASSESSMENT — ENCOUNTER SYMPTOMS
ABDOMINAL PAIN: 0
COUGH: 0
BLOOD IN STOOL: 1
DIARRHEA: 0
NAUSEA: 0
VOMITING: 0
SHORTNESS OF BREATH: 0

## 2024-04-09 NOTE — PROGRESS NOTES
[facility]  Yareli Valenzuela Antonella, DO  [address], [place] [address] [phone]        Hermelinda Holliday is a 18 y.o. female who was seen by synchronous (real-time) audio-video technology on 4/9/2024.      ASSESSMENT & PLAN:    Problem List Items Addressed This Visit          Other    Attention deficit hyperactivity disorder (ADHD), predominantly inattentive type      Mild improvement with Wellbutrin, patient has some dizziness but had this prior to starting the Wellbutrin.  States that it has helped some.  Never heard from the ADHD specialist.  Will send their information to her on [employer] for her to contact them.         Dizziness - Primary      Intermittent, seems to be when she changes positions, recent cold so may be vertigo but also with black stools.  Patient finishes school in 2-3 weeks, will have her return to [place] for follow up with labs.  Discussed stopping OTC meds except for Allegra-D to see if this helps get rid of the fluid behind her ears and the importance of hydration.             The diagnoses and plan were discussed with the patient, who verbalizes understanding and agrees with plan.  All questions answered.    Chief Complaint    Chief Complaint   Patient presents with    2 month visit     Medication refills.        HISTORY OF PRESENT ILLNESS    18 y.o. female with ADHD presents today for virtual appointment for follow up.  Never heard from the ADHD specialist.  States that she often wakes up with dizziness, thought it was from the medicine but stopped the Strattera and her dizziness has not gone away.  Feels off-balance and sometimes falls.  Will feel nauseated at times but states that she does not vomit.  States that she is coming off of a cold but has not had a lot of congestion prior to this.  Has noticed some fluid in her ears.  States that she is not getting a lot of headaches.  Notes that her eyes get shaky when her vision is

## 2024-04-09 NOTE — PATIENT INSTRUCTIONS
IT WAS GREAT TO SEE YOU TODAY!    WE WILL SEE YOU IN 3-4 WEEKS TO CHECK LABS.    PLEASE TAKE ALL MEDICATION AS DISCUSSED.    ~STOP ALL OVER-THE-COUNTER MEDICATIONS EXCEPT FOR ALLEGRA-D TO HELP WITH FLUID BEHIND YOUR EARS, WHICH CAN CAUSE DIZZINESS.    CHANGE POSITIONS SLOWLY AND MAKE SURE TO DRINK LOTS OF WATER!    I WILL SEE YOU AGAIN IN 4 WEEKS BUT PLEASE CALL WITH CONCERNS [phone]    CALL THIS OFFICE TO SEE IF THEY STILL HAVE THE REFERRAL FROM ME:  [facility]  [address], [place] [address] [phone]

## 2024-04-09 NOTE — ASSESSMENT & PLAN NOTE
Intermittent, seems to be when she changes positions, recent cold so may be vertigo but also with black stools.  Patient finishes school in 2-3 weeks, will have her return to [place] for follow up with labs.  Discussed stopping OTC meds except for Allegra-D to see if this helps get rid of the fluid behind her ears and the importance of hydration.

## 2024-04-09 NOTE — ASSESSMENT & PLAN NOTE
Mild improvement with Wellbutrin, patient has some dizziness but had this prior to starting the Wellbutrin.  States that it has helped some.  Never heard from the ADHD specialist.  Will send their information to her on [employer] for her to contact them.

## 2024-04-15 RX ORDER — BUPROPION HYDROCHLORIDE 150 MG/1
150 TABLET ORAL EVERY MORNING
Qty: 30 TABLET | Refills: 3 | Status: SHIPPED | OUTPATIENT
Start: 2024-04-15

## 2024-04-15 NOTE — TELEPHONE ENCOUNTER
buPROPion (WELLBUTRIN XL) 150 MG extended release tablet [Dr. Yareli Berman, DO]     [id number] [address] - P [phone] - F [phone]

## 2024-04-26 ENCOUNTER — TELEPHONE (OUTPATIENT)
Dept: PRIMARY CARE CLINIC | Facility: CLINIC | Age: 19
End: 2024-04-26

## 2024-04-26 NOTE — TELEPHONE ENCOUNTER
----- Message from Malgorzata Shelby sent at 4/18/2024  4:13 PM EDT -----  Subject: Appointment Request    Reason for Call: Established Patient Appointment needed: Routine Existing   Condition Follow Up    QUESTIONS    Reason for appointment request? Available appointments did not meet   patient need     Additional Information for Provider? The patient called stating her PCP   Yareli Berman wanted her to follow up with blood work and ADHD   medication. The patient has no lab orders and would like seen earlier than   June, the PCPs next availability.  ---------------------------------------------------------------------------  --------------  CALL BACK INFO  [phone]; OK to leave message on voicemail  ---------------------------------------------------------------------------  --------------  SCRIPT ANSWERS

## 2024-05-01 ENCOUNTER — PATIENT MESSAGE (OUTPATIENT)
Dept: PRIMARY CARE CLINIC | Facility: CLINIC | Age: 19
End: 2024-05-01

## 2024-05-01 DIAGNOSIS — K92.1 BLACK STOOLS: ICD-10-CM

## 2024-05-01 DIAGNOSIS — R42 DIZZINESS: Primary | ICD-10-CM

## 2024-05-01 DIAGNOSIS — R42 DIZZINESS: ICD-10-CM

## 2024-05-01 LAB
ALBUMIN SERPL-MCNC: 4 G/DL (ref 3.5–5)
ALBUMIN/GLOB SERPL: 1.1 (ref 1–1.9)
ALP SERPL-CCNC: 73 U/L (ref 35–104)
ALT SERPL-CCNC: 25 U/L (ref 12–65)
ANION GAP SERPL CALC-SCNC: 9 MMOL/L (ref 9–18)
AST SERPL-CCNC: 24 U/L (ref 15–37)
BASOPHILS # BLD: 0 K/UL (ref 0–0.2)
BASOPHILS NFR BLD: 0 % (ref 0–2)
BILIRUB SERPL-MCNC: 0.6 MG/DL (ref 0–1.2)
BUN SERPL-MCNC: 12 MG/DL (ref 6–23)
CALCIUM SERPL-MCNC: 9.4 MG/DL (ref 8.8–10.2)
CHLORIDE SERPL-SCNC: 105 MMOL/L (ref 98–107)
CO2 SERPL-SCNC: 24 MMOL/L (ref 20–28)
CREAT SERPL-MCNC: 0.76 MG/DL (ref 0.6–1.1)
DIFFERENTIAL METHOD BLD: ABNORMAL
EOSINOPHIL # BLD: 0.2 K/UL (ref 0–0.8)
EOSINOPHIL NFR BLD: 3 % (ref 0.5–7.8)
ERYTHROCYTE [DISTWIDTH] IN BLOOD BY AUTOMATED COUNT: 19.5 % (ref 11.9–14.6)
GLOBULIN SER CALC-MCNC: 3.7 G/DL (ref 2.3–3.5)
GLUCOSE SERPL-MCNC: 94 MG/DL (ref 70–99)
HCT VFR BLD AUTO: 35.9 % (ref 35.8–46.3)
HGB BLD-MCNC: 10.9 G/DL (ref 11.7–15.4)
IMM GRANULOCYTES # BLD AUTO: 0 K/UL (ref 0–0.5)
IMM GRANULOCYTES NFR BLD AUTO: 0 % (ref 0–5)
LYMPHOCYTES # BLD: 1.3 K/UL (ref 0.5–4.6)
LYMPHOCYTES NFR BLD: 22 % (ref 13–44)
MCH RBC QN AUTO: 23 PG (ref 26.1–32.9)
MCHC RBC AUTO-ENTMCNC: 30.4 G/DL (ref 31.4–35)
MCV RBC AUTO: 75.7 FL (ref 82–102)
MONOCYTES # BLD: 0.4 K/UL (ref 0.1–1.3)
MONOCYTES NFR BLD: 7 % (ref 4–12)
NEUTS SEG # BLD: 4 K/UL (ref 1.7–8.2)
NEUTS SEG NFR BLD: 68 % (ref 43–78)
NRBC # BLD: 0 K/UL (ref 0–0.2)
PLATELET # BLD AUTO: 233 K/UL (ref 150–450)
PMV BLD AUTO: 10.5 FL (ref 9.4–12.3)
POTASSIUM SERPL-SCNC: 4.2 MMOL/L (ref 3.5–5.1)
PROT SERPL-MCNC: 7.7 G/DL (ref 6.3–8.2)
RBC # BLD AUTO: 4.74 M/UL (ref 4.05–5.2)
SODIUM SERPL-SCNC: 138 MMOL/L (ref 136–145)
TSH, 3RD GENERATION: 1.48 UIU/ML (ref 0.27–4.2)
WBC # BLD AUTO: 5.8 K/UL (ref 4.3–11.1)

## 2024-05-02 RX ORDER — BUPROPION HYDROCHLORIDE 150 MG/1
150 TABLET ORAL EVERY MORNING
Qty: 90 TABLET | Refills: 1 | Status: SHIPPED | OUTPATIENT
Start: 2024-05-02

## 2024-05-02 RX ORDER — FERROUS SULFATE 325(65) MG
325 TABLET ORAL
Qty: 90 TABLET | Refills: 1 | Status: SHIPPED | OUTPATIENT
Start: 2024-05-02

## 2024-05-02 NOTE — TELEPHONE ENCOUNTER
Evan Morales MA 5/2/2024 11:57 AM EDT    Are you wanting Elham to start taking anything for being anemic ??      ----- Message -----  From: Hermelinda Holliday  Sent: 5/2/2024 11:02 AM EDT  To: *  Subject: ADHD medicine     I can call and ask abt the travel medicine insurance because I am not sure. I’ll be there for about five weeks from may 6 to June 6. Also, I wanted to let you know I’ve never been anemic before. I am not sure how I became that way.

## 2024-06-18 RX ORDER — ONDANSETRON 4 MG/1
4 TABLET, ORALLY DISINTEGRATING ORAL 3 TIMES DAILY PRN
Qty: 21 TABLET | Refills: 0 | Status: SHIPPED | OUTPATIENT
Start: 2024-06-18

## 2024-06-25 RX ORDER — AMITRIPTYLINE HYDROCHLORIDE 25 MG/1
25 TABLET, FILM COATED ORAL NIGHTLY
Qty: 90 TABLET | Refills: 1 | Status: SHIPPED | OUTPATIENT
Start: 2024-06-25

## 2024-07-10 ENCOUNTER — OFFICE VISIT (OUTPATIENT)
Dept: PRIMARY CARE CLINIC | Facility: CLINIC | Age: 19
End: 2024-07-10
Payer: COMMERCIAL

## 2024-07-10 VITALS
TEMPERATURE: 97.5 F | DIASTOLIC BLOOD PRESSURE: 64 MMHG | BODY MASS INDEX: 26.52 KG/M2 | WEIGHT: 165 LBS | OXYGEN SATURATION: 97 % | HEART RATE: 67 BPM | HEIGHT: 66 IN | SYSTOLIC BLOOD PRESSURE: 120 MMHG

## 2024-07-10 DIAGNOSIS — K58.2 IRRITABLE BOWEL SYNDROME WITH BOTH CONSTIPATION AND DIARRHEA: ICD-10-CM

## 2024-07-10 DIAGNOSIS — F90.0 ATTENTION DEFICIT HYPERACTIVITY DISORDER (ADHD), PREDOMINANTLY INATTENTIVE TYPE: ICD-10-CM

## 2024-07-10 DIAGNOSIS — D64.9 ANEMIA, UNSPECIFIED TYPE: ICD-10-CM

## 2024-07-10 DIAGNOSIS — R42 DIZZINESS: Primary | ICD-10-CM

## 2024-07-10 LAB
BASOPHILS # BLD: 0 K/UL (ref 0–0.2)
BASOPHILS NFR BLD: 0 % (ref 0–2)
DIFFERENTIAL METHOD BLD: ABNORMAL
EOSINOPHIL # BLD: 0.2 K/UL (ref 0–0.8)
EOSINOPHIL NFR BLD: 3 % (ref 0.5–7.8)
ERYTHROCYTE [DISTWIDTH] IN BLOOD BY AUTOMATED COUNT: 17.1 % (ref 11.9–14.6)
HCT VFR BLD AUTO: 39.5 % (ref 35.8–46.3)
HGB BLD-MCNC: 11.9 G/DL (ref 11.7–15.4)
IMM GRANULOCYTES # BLD AUTO: 0 K/UL (ref 0–0.5)
IMM GRANULOCYTES NFR BLD AUTO: 0 % (ref 0–5)
IRON SERPL-MCNC: 92 UG/DL (ref 35–100)
LYMPHOCYTES # BLD: 1.2 K/UL (ref 0.5–4.6)
LYMPHOCYTES NFR BLD: 26 % (ref 13–44)
MCH RBC QN AUTO: 25.4 PG (ref 26.1–32.9)
MCHC RBC AUTO-ENTMCNC: 30.1 G/DL (ref 31.4–35)
MCV RBC AUTO: 84.4 FL (ref 82–102)
MONOCYTES # BLD: 0.4 K/UL (ref 0.1–1.3)
MONOCYTES NFR BLD: 8 % (ref 4–12)
NEUTS SEG # BLD: 2.8 K/UL (ref 1.7–8.2)
NEUTS SEG NFR BLD: 62 % (ref 43–78)
NRBC # BLD: 0 K/UL (ref 0–0.2)
PLATELET # BLD AUTO: 234 K/UL (ref 150–450)
PMV BLD AUTO: 10.9 FL (ref 9.4–12.3)
RBC # BLD AUTO: 4.68 M/UL (ref 4.05–5.2)
WBC # BLD AUTO: 4.5 K/UL (ref 4.3–11.1)

## 2024-07-10 PROCEDURE — 99214 OFFICE O/P EST MOD 30 MIN: CPT | Performed by: FAMILY MEDICINE

## 2024-07-10 RX ORDER — FLUTICASONE PROPIONATE 50 MCG
2 SPRAY, SUSPENSION (ML) NASAL DAILY
COMMUNITY
Start: 2024-03-30

## 2024-07-10 RX ORDER — ONDANSETRON 4 MG/1
4 TABLET, ORALLY DISINTEGRATING ORAL 3 TIMES DAILY PRN
Qty: 30 TABLET | Refills: 5 | Status: SHIPPED | OUTPATIENT
Start: 2024-07-10

## 2024-07-10 RX ORDER — CETIRIZINE HYDROCHLORIDE 10 MG/1
10 TABLET ORAL DAILY
Qty: 30 TABLET | Refills: 0 | Status: SHIPPED | OUTPATIENT
Start: 2024-07-10

## 2024-07-10 SDOH — ECONOMIC STABILITY: FOOD INSECURITY: WITHIN THE PAST 12 MONTHS, YOU WORRIED THAT YOUR FOOD WOULD RUN OUT BEFORE YOU GOT MONEY TO BUY MORE.: NEVER TRUE

## 2024-07-10 SDOH — ECONOMIC STABILITY: FOOD INSECURITY: WITHIN THE PAST 12 MONTHS, THE FOOD YOU BOUGHT JUST DIDN'T LAST AND YOU DIDN'T HAVE MONEY TO GET MORE.: NEVER TRUE

## 2024-07-10 SDOH — ECONOMIC STABILITY: INCOME INSECURITY: HOW HARD IS IT FOR YOU TO PAY FOR THE VERY BASICS LIKE FOOD, HOUSING, MEDICAL CARE, AND HEATING?: NOT HARD AT ALL

## 2024-07-10 ASSESSMENT — PATIENT HEALTH QUESTIONNAIRE - PHQ9
SUM OF ALL RESPONSES TO PHQ QUESTIONS 1-9: 0
SUM OF ALL RESPONSES TO PHQ QUESTIONS 1-9: 0
1. LITTLE INTEREST OR PLEASURE IN DOING THINGS: NOT AT ALL
SUM OF ALL RESPONSES TO PHQ9 QUESTIONS 1 & 2: 0
SUM OF ALL RESPONSES TO PHQ QUESTIONS 1-9: 0
SUM OF ALL RESPONSES TO PHQ QUESTIONS 1-9: 0
2. FEELING DOWN, DEPRESSED OR HOPELESS: NOT AT ALL

## 2024-07-10 ASSESSMENT — ENCOUNTER SYMPTOMS
NAUSEA: 1
VOMITING: 0
COUGH: 0
SHORTNESS OF BREATH: 0
DIARRHEA: 1
ABDOMINAL PAIN: 1

## 2024-07-10 NOTE — ASSESSMENT & PLAN NOTE
Found on last lab work, patient has been taking iron for the last 2 months.  Will recheck blood count today.

## 2024-07-10 NOTE — ASSESSMENT & PLAN NOTE
Chronic, acute worsening, likely related to recent travel.  No significant improvement from amitriptyline so we will wean off amitriptyline and continue use of ondansetron and Levsin or Bentyl as needed for symptoms.

## 2024-07-10 NOTE — PROGRESS NOTES
[facility]  Yareli Berman, DO  [address], [address]  [place] [id number] [phone]         ASSESSMENT AND PLAN    Problem List Items Addressed This Visit          Digestive    Irritable bowel syndrome with both constipation and diarrhea      Chronic, acute worsening, likely related to recent travel.  No significant improvement from amitriptyline so we will wean off amitriptyline and continue use of ondansetron and Levsin or Bentyl as needed for symptoms.         Relevant Medications    ondansetron (ZOFRAN-ODT) 4 MG disintegrating tablet       Other    Attention deficit hyperactivity disorder (ADHD), predominantly inattentive type     Chronic, improved on Wellbutrin.  Will continue this dose for now.         Dizziness - Primary      Intermittent, has had for months with no obvious findings.  Labs showed mild anemia but patient has been taking iron with no change in her symptoms.  Possibly medication related.  Will wean off amitriptyline as she is not getting much improvement in her IBS symptoms from this we will see if dizziness improves.         Anemia      Found on last lab work, patient has been taking iron for the last 2 months.  Will recheck blood count today.         Relevant Orders    CBC with Auto Differential    Iron        The diagnoses and plan were discussed with the patient, who verbalizes understanding and agrees with plan.  All questions answered.    Chief Complaint    Chief Complaint   Patient presents with    Dizziness     Body will shake and eyes as well if it gets bad.          HISTORY OF PRESENT ILLNESS    18 y.o. female with IBS presents today for follow up.  Last seen virtually three months ago for dizziness.  Has had worsening dizziness that comes on randomly, usually in the afternoon or evening.  States that she had a lot last week when she [place]  Notes that she does not get a

## 2024-07-10 NOTE — PATIENT INSTRUCTIONS
IT WAS GREAT TO SEE YOU TODAY!    I WILL CONTACT YOU WITH THE RESULTS OF YOUR LABS.    PLEASE TAKE ALL MEDICATION AS DISCUSSED.    ~TAKE HALF OF YOUR AMITRIPTYLINE AT BEDTIME FOR A MONTH, THEN TAKE HALF AN AMITRIPTYLINE EVERY OTHER NIGHT FOR TWO WEEKS TO WEAN OFF.    I WILL SEE YOU AGAIN IN 3 MONTHS BUT PLEASE CALL WITH CONCERNS [phone]

## 2024-07-10 NOTE — ASSESSMENT & PLAN NOTE
Intermittent, has had for months with no obvious findings.  Labs showed mild anemia but patient has been taking iron with no change in her symptoms.  Possibly medication related.  Will wean off amitriptyline as she is not getting much improvement in her IBS symptoms from this we will see if dizziness improves.

## 2024-07-21 ENCOUNTER — PATIENT MESSAGE (OUTPATIENT)
Dept: PRIMARY CARE CLINIC | Facility: CLINIC | Age: 19
End: 2024-07-21

## 2024-07-25 NOTE — TELEPHONE ENCOUNTER
Yu Christian 7/22/2024 4:13 PM EDT      ----- Message -----  From: Hermelinda Holliday  Sent: 7/21/2024 5:50 PM EDT  To: *  Subject: Dizzy and nausea     Hey so I’ve been trying to manage it but the dizziness seems like it’s getting worse. I get super dizzy almost everyday. I have been weaning of the IBS medicine and ik it takes time. Though is there something I can do to manage this better?

## 2024-07-31 ENCOUNTER — OFFICE VISIT (OUTPATIENT)
Dept: PRIMARY CARE CLINIC | Facility: CLINIC | Age: 19
End: 2024-07-31
Payer: COMMERCIAL

## 2024-07-31 ENCOUNTER — PATIENT MESSAGE (OUTPATIENT)
Dept: PRIMARY CARE CLINIC | Facility: CLINIC | Age: 19
End: 2024-07-31

## 2024-07-31 VITALS
SYSTOLIC BLOOD PRESSURE: 124 MMHG | HEART RATE: 78 BPM | TEMPERATURE: 98.1 F | WEIGHT: 165 LBS | OXYGEN SATURATION: 99 % | BODY MASS INDEX: 27.49 KG/M2 | DIASTOLIC BLOOD PRESSURE: 82 MMHG | HEIGHT: 65 IN

## 2024-07-31 DIAGNOSIS — F90.0 ATTENTION DEFICIT HYPERACTIVITY DISORDER (ADHD), PREDOMINANTLY INATTENTIVE TYPE: ICD-10-CM

## 2024-07-31 DIAGNOSIS — H81.12 BENIGN PAROXYSMAL POSITIONAL VERTIGO, LEFT: Primary | ICD-10-CM

## 2024-07-31 DIAGNOSIS — F90.0 ATTENTION DEFICIT HYPERACTIVITY DISORDER (ADHD), PREDOMINANTLY INATTENTIVE TYPE: Primary | ICD-10-CM

## 2024-07-31 DIAGNOSIS — F41.9 ANXIETY: ICD-10-CM

## 2024-07-31 PROCEDURE — 99214 OFFICE O/P EST MOD 30 MIN: CPT

## 2024-07-31 RX ORDER — METHYLPHENIDATE 8.6 MG/1
8.6 TABLET, ORALLY DISINTEGRATING ORAL DAILY
Qty: 30 TABLET | Refills: 0 | Status: SHIPPED | OUTPATIENT
Start: 2024-07-31 | End: 2024-08-02 | Stop reason: SDUPTHER

## 2024-07-31 ASSESSMENT — PATIENT HEALTH QUESTIONNAIRE - PHQ9
SUM OF ALL RESPONSES TO PHQ QUESTIONS 1-9: 0
1. LITTLE INTEREST OR PLEASURE IN DOING THINGS: NOT AT ALL
SUM OF ALL RESPONSES TO PHQ9 QUESTIONS 1 & 2: 0
SUM OF ALL RESPONSES TO PHQ QUESTIONS 1-9: 0
2. FEELING DOWN, DEPRESSED OR HOPELESS: NOT AT ALL
SUM OF ALL RESPONSES TO PHQ QUESTIONS 1-9: 0
SUM OF ALL RESPONSES TO PHQ QUESTIONS 1-9: 0

## 2024-07-31 ASSESSMENT — ENCOUNTER SYMPTOMS
VOMITING: 0
NAUSEA: 0
SHORTNESS OF BREATH: 0
DIARRHEA: 0
CHEST TIGHTNESS: 0
BLOOD IN STOOL: 0
COUGH: 0

## 2024-07-31 NOTE — PROGRESS NOTES
[facility]  Sintia \"Teodora\" TAN Peters  [address], [place] [address]  [phone]         ASSESSMENT AND PLAN    Problem List Items Addressed This Visit          Nervous and Auditory    Benign paroxysmal positional vertigo, left - Primary     [place]-Hallpike maneuver positive on L side.   - showed how to do epley maneuver & provided handout  - referred to vestibular therapy         Relevant Orders    BS - Physical Therapy, [facility]       Other    Attention deficit hyperactivity disorder (ADHD), predominantly inattentive type     Chronic, slightly improved on wellbutrin, but patient not interested in titrating up & is interested in trying stimulant medication again.  - advises that she still needs to call ad/hd specialist. Helped her set timer on phone to do later.   - reports that swimming doesn't help her focus, but helps her feel more motivated  - discussed making to do lists with routine items on it alongside big things (e.g. making bed, showering), so that can have a little dopamine release when crosses them off  - does not drink or smoke or do illicit drugs  - family history of ad/hd  - did not do well on adderall, per patient- upset gi  - discussed maybe trying odt, since less gi side effects & doing methylphenidate, instead of adderall line. Discussed that insurance likely won't cover, but company has coupon. Sent in script for lowest dose cotempla to pharmacy on file. Advised to take 1 in the morning for a few days. If ineffective, can take two pills in am & notifiy us, if need to change regimen.   - advised to continue wellbutrin for now & will discuss titrating off, if needed, once see how does with stimulant medication.         Relevant Medications    Methylphenidate (COTEMPLA XR-ODT) 8.6 MG TBED        The diagnoses and plan were discussed with the patient, who verbalizes understanding and agrees with plan.  All questions

## 2024-07-31 NOTE — ASSESSMENT & PLAN NOTE
Chronic, slightly improved on wellbutrin, but patient not interested in titrating up & is interested in trying stimulant medication again.  - advises that she still needs to call ad/hd specialist. Helped her set timer on phone to do later.   - reports that swimming doesn't help her focus, but helps her feel more motivated  - discussed making to do lists with routine items on it alongside big things (e.g. making bed, showering), so that can have a little dopamine release when crosses them off  - does not drink or smoke or do illicit drugs  - family history of ad/hd  - did not do well on adderall, per patient- upset gi  - discussed maybe trying odt, since less gi side effects & doing methylphenidate, instead of adderall line. Discussed that insurance likely won't cover, but company has coupon. Sent in script for lowest dose cotempla to pharmacy on file. Advised to take 1 in the morning for a few days. If ineffective, can take two pills in am & notifiy us, if need to change regimen.   - advised to continue wellbutrin for now & will discuss titrating off, if needed, once see how does with stimulant medication.

## 2024-07-31 NOTE — TELEPHONE ENCOUNTER
Elena Garcia 7/31/2024 1:38 PM EDT      ----- Message -----  From: Hermelinda Holliday  Sent: 7/31/2024 1:37 PM EDT  To: *  Subject: ADHD specialist     Hey, I wanted to make an appointment with the adhd specialist you requested but they don’t take any issuance. I was wondering if you could send out a referral for me to the [facility] adhd specialist?

## 2024-07-31 NOTE — PATIENT INSTRUCTIONS
IT WAS GREAT TO MEET YOU TODAY!    I'VE ATTACHED THE EPLEY MANEUVER & OTHER VERTIGO EXCERCISES, AS WELL AS INFO SHEETS ABOUT [facility] AND THE COTEMPLA WE PRESCRIBED.    I REFERRED YOU TO PHYSICAL THERAPY FOR VERTIGO, BUT UNDERSTAND THAT YOU ARE HEADING BACK TO SCHOOL SOON, BUT HOPEFULLY IT WILL WORK OUT!    AD/HD MEDICATION  - TAKE YOUR COTEMPLA IN THE MORNING  - LET IT DISSOLVE IN YOUR MOUTH. DO NOT BRUSH YOUR TEETH RIGHT AFTER TAKING THIS MEDICATION  - IF THE 8.6 MG TABLET DOESN'T WORK, I WANT YOU TO TAKE 2 IN THE MORNING INSTEAD  - IF THE 8.6 TABLET WORKS, BUT DOESN'T LAST LONG ENOUGH, I WANT YOU TO TRY TAKING 1 IN THE MORNING AND 1 TABLET 6 HOURS LATER  - IF THIS MEDICATION UPSETS YOUR STOMACH OR YOU HAVE OTHER SIDE EFFECTS, PLEASE LET US KNOW  - IN SUM, PLEASE LET US KNOW IN THE NEXT TWO WEEKS HOW THINGS ARE GOING SO I CAN SEND IN REFILLS BEFORE YOU HEAD BACK TO SCHOOL!  - PLEASE LET US KNOW, IF YOU STILL WANT TO TITRATE OFF THE WELLBUTRIN, BUT I THINK WE SHOULD WAIT UNTIL WE SEE HOW THIS NEW MEDICATION EFFECTS YOU FIRST  - REMEMBER TO CALL THE AD/HD SPECIALIST :)    CONTINUE TO TITRATE OFF THE AMITRIPTYLINE (ELAVIL)    DRINK LOTS OF WATER!! WEAR YOUR SEATBELT.     WE WILL SEE YOU AGAIN AT YOUR FOLLOW-UP APPOINTMENT WITH DR. COOLEY 10/14/2024 BUT PLEASE CALL WITH CONCERNS [phone]

## 2024-07-31 NOTE — ASSESSMENT & PLAN NOTE
[place]-Hallpike maneuver positive on L side.   - showed how to do epley maneuver & provided handout  - referred to vestibular therapy

## 2024-08-02 ENCOUNTER — TELEPHONE (OUTPATIENT)
Dept: PRIMARY CARE CLINIC | Facility: CLINIC | Age: 19
End: 2024-08-02

## 2024-08-02 DIAGNOSIS — F90.0 ATTENTION DEFICIT HYPERACTIVITY DISORDER (ADHD), PREDOMINANTLY INATTENTIVE TYPE: ICD-10-CM

## 2024-08-02 RX ORDER — METHYLPHENIDATE 8.6 MG/1
8.6 TABLET, ORALLY DISINTEGRATING ORAL DAILY
Qty: 30 TABLET | Refills: 0 | Status: SHIPPED | OUTPATIENT
Start: 2024-08-02 | End: 2024-09-01

## 2024-08-02 NOTE — TELEPHONE ENCOUNTER
[facility] called stating they were out of the   Methylphenidate (COTEMPLA XR-ODT) 8.6 MG TBED [[id number]]  but the [facility] at [address] [place] [address] - P [phone] - f [phone] so is asking if we could send it there.

## 2024-08-06 RX ORDER — FERROUS SULFATE 325(65) MG
325 TABLET ORAL
Qty: 90 TABLET | Refills: 1 | Status: CANCELLED | OUTPATIENT
Start: 2024-08-06

## 2024-08-06 RX ORDER — FERROUS SULFATE 325(65) MG
325 TABLET ORAL
Qty: 90 TABLET | Refills: 1 | Status: SHIPPED | OUTPATIENT
Start: 2024-08-06

## 2024-08-12 RX ORDER — ONDANSETRON 4 MG/1
4 TABLET, ORALLY DISINTEGRATING ORAL 3 TIMES DAILY PRN
Qty: 30 TABLET | Refills: 5 | Status: SHIPPED | OUTPATIENT
Start: 2024-08-12

## 2024-09-08 ENCOUNTER — PATIENT MESSAGE (OUTPATIENT)
Dept: PRIMARY CARE CLINIC | Facility: CLINIC | Age: 19
End: 2024-09-08

## 2024-09-10 RX ORDER — DICYCLOMINE HCL 20 MG
40 TABLET ORAL EVERY 8 HOURS PRN
Qty: 120 TABLET | Refills: 5 | Status: SHIPPED | OUTPATIENT
Start: 2024-09-10

## 2024-10-14 ENCOUNTER — OFFICE VISIT (OUTPATIENT)
Dept: PRIMARY CARE CLINIC | Facility: CLINIC | Age: 19
End: 2024-10-14
Payer: COMMERCIAL

## 2024-10-14 VITALS
HEIGHT: 66 IN | DIASTOLIC BLOOD PRESSURE: 62 MMHG | WEIGHT: 154 LBS | HEART RATE: 97 BPM | SYSTOLIC BLOOD PRESSURE: 98 MMHG | OXYGEN SATURATION: 97 % | TEMPERATURE: 98.4 F | BODY MASS INDEX: 24.75 KG/M2

## 2024-10-14 DIAGNOSIS — F90.0 ATTENTION DEFICIT HYPERACTIVITY DISORDER (ADHD), PREDOMINANTLY INATTENTIVE TYPE: Primary | ICD-10-CM

## 2024-10-14 DIAGNOSIS — D50.8 IRON DEFICIENCY ANEMIA SECONDARY TO INADEQUATE DIETARY IRON INTAKE: ICD-10-CM

## 2024-10-14 DIAGNOSIS — R42 DIZZINESS: ICD-10-CM

## 2024-10-14 DIAGNOSIS — K58.2 IRRITABLE BOWEL SYNDROME WITH BOTH CONSTIPATION AND DIARRHEA: ICD-10-CM

## 2024-10-14 DIAGNOSIS — J06.9 VIRAL UPPER RESPIRATORY TRACT INFECTION: ICD-10-CM

## 2024-10-14 LAB
BASOPHILS # BLD: 0 K/UL (ref 0–0.2)
BASOPHILS NFR BLD: 1 % (ref 0–2)
DIFFERENTIAL METHOD BLD: NORMAL
EOSINOPHIL # BLD: 0.1 K/UL (ref 0–0.8)
EOSINOPHIL NFR BLD: 1 % (ref 0.5–7.8)
ERYTHROCYTE [DISTWIDTH] IN BLOOD BY AUTOMATED COUNT: 13.5 % (ref 11.9–14.6)
HCT VFR BLD AUTO: 39.2 % (ref 35.8–46.3)
HGB BLD-MCNC: 12.6 G/DL (ref 11.7–15.4)
IMM GRANULOCYTES # BLD AUTO: 0 K/UL (ref 0–0.5)
IMM GRANULOCYTES NFR BLD AUTO: 0 % (ref 0–5)
IRON SERPL-MCNC: 31 UG/DL (ref 35–100)
LYMPHOCYTES # BLD: 1.1 K/UL (ref 0.5–4.6)
LYMPHOCYTES NFR BLD: 16 % (ref 13–44)
MCH RBC QN AUTO: 28 PG (ref 26.1–32.9)
MCHC RBC AUTO-ENTMCNC: 32.1 G/DL (ref 31.4–35)
MCV RBC AUTO: 87.1 FL (ref 82–102)
MONOCYTES # BLD: 0.7 K/UL (ref 0.1–1.3)
MONOCYTES NFR BLD: 11 % (ref 4–12)
NEUTS SEG # BLD: 4.8 K/UL (ref 1.7–8.2)
NEUTS SEG NFR BLD: 71 % (ref 43–78)
NRBC # BLD: 0.02 K/UL (ref 0–0.2)
PLATELET # BLD AUTO: 224 K/UL (ref 150–450)
PMV BLD AUTO: 11.5 FL (ref 9.4–12.3)
RBC # BLD AUTO: 4.5 M/UL (ref 4.05–5.2)
WBC # BLD AUTO: 6.7 K/UL (ref 4.3–11.1)

## 2024-10-14 PROCEDURE — 99214 OFFICE O/P EST MOD 30 MIN: CPT | Performed by: FAMILY MEDICINE

## 2024-10-14 RX ORDER — ONDANSETRON 8 MG/1
8 TABLET, ORALLY DISINTEGRATING ORAL EVERY 8 HOURS PRN
Qty: 30 TABLET | Refills: 5 | Status: SHIPPED | OUTPATIENT
Start: 2024-10-14

## 2024-10-14 RX ORDER — BROMPHENIRAMINE MALEATE, PSEUDOEPHEDRINE HYDROCHLORIDE, AND DEXTROMETHORPHAN HYDROBROMIDE 2; 30; 10 MG/5ML; MG/5ML; MG/5ML
5 SYRUP ORAL 4 TIMES DAILY PRN
Qty: 118 ML | Refills: 0 | Status: SHIPPED | OUTPATIENT
Start: 2024-10-14

## 2024-10-14 ASSESSMENT — ENCOUNTER SYMPTOMS
ABDOMINAL PAIN: 0
NAUSEA: 0
DIARRHEA: 1
COUGH: 0
VOMITING: 0
RHINORRHEA: 1
SHORTNESS OF BREATH: 0
BLOOD IN STOOL: 0

## 2024-10-14 NOTE — PROGRESS NOTES
Family: Not asked   Intimate Partner Violence: Unknown (1/7/2022)    Received from [facility]    Intimate Partner Violence     Fear of Current or Ex-Partner: Not asked     Emotionally Abused: Not asked     Physically Abused: Not asked     Sexually Abused: Not asked   Housing Stability: Unknown (7/10/2024)    Housing Stability Vital Sign     Unstable Housing in the Last Year: No       MEDICATIONS      Current Outpatient Medications:     ondansetron (ZOFRAN-ODT) 8 MG TBDP disintegrating tablet, Take 1 tablet by mouth every 8 hours as needed for Nausea or Vomiting, Disp: 30 tablet, Rfl: 5    brompheniramine-pseudoephedrine-DM 2-30-10 MG/5ML syrup, Take 5 mLs by mouth 4 times daily as needed for Cough, Disp: 118 mL, Rfl: 0    dicyclomine (BENTYL) 20 MG tablet, Take 2 tablets by mouth every 8 hours as needed (cramping), Disp: 120 tablet, Rfl: 5    ferrous sulfate (IRON 325) 325 (65 Fe) MG tablet, Take 1 tablet by mouth daily (with breakfast), Disp: 90 tablet, Rfl: 1    fluticasone (FLONASE) 50 MCG/ACT nasal spray, 2 sprays by Nasal route daily, Disp: , Rfl:     cetirizine (ZYRTEC) 10 MG tablet, Take 1 tablet by mouth daily, Disp: 30 tablet, Rfl: 0    amitriptyline (ELAVIL) 25 MG tablet, Take 1 tablet by mouth nightly, Disp: 90 tablet, Rfl: 1    buPROPion (WELLBUTRIN XL) 150 MG extended release tablet, Take 1 tablet by mouth every morning Please allow patient to fill 90 day supply as she is leaving the [place] from [place], Disp: 90 tablet, Rfl: 1    scopolamine (TRANSDERM-SCOP) transdermal patch, Place 1 patch onto the skin every 72 hours Remove one patch before placing a second patch, Disp: 4 patch, Rfl: 0    hyoscyamine (ANASPAZ;LEVSIN) 125 MCG tablet, Take 1 tablet by mouth every 6 hours as needed for Cramping, Disp: 90 tablet, Rfl: 0    ALLERGIES / INTOLERANCES    Allergies   Allergen Reactions    Amoxicillin Rash    Tomato Nausea And Vomiting       REVIEW OF SYSTEMS    Review of Systems

## 2024-10-14 NOTE — PATIENT INSTRUCTIONS
IT WAS GREAT TO SEE YOU TODAY!    I WILL CONTACT YOU WITH THE RESULTS OF YOUR LABS.    PLEASE TAKE ALL MEDICATION AS DISCUSSED.    ~WE ARE INCREASING ONDANSETRON TO 8 MG AS NEEDED FOR NAUSEA.    CALL AND SCHEDULE A FOLLOW UP APPOINTMENT WITH YOUR GI PHYSICIAN.    I WILL SEE YOU AGAIN IN 6 MONTHS BUT PLEASE CALL WITH CONCERNS [phone]

## 2024-10-14 NOTE — ASSESSMENT & PLAN NOTE
Chronic, improving with short-acting Amphetamine per [facility], will see them for follow up next week as she did not do well with the [place].

## 2024-10-14 NOTE — ASSESSMENT & PLAN NOTE
Chronic, slightly worse since stopping the Amitriptyline, will increase Zofran to 8 mg as needed.  Patient to follow up with GI after this semester.

## 2024-11-04 RX ORDER — DICYCLOMINE HCL 20 MG
40 TABLET ORAL EVERY 8 HOURS PRN
Qty: 120 TABLET | Refills: 5 | Status: SHIPPED | OUTPATIENT
Start: 2024-11-04

## 2024-11-15 RX ORDER — ONDANSETRON 8 MG/1
8 TABLET, ORALLY DISINTEGRATING ORAL EVERY 8 HOURS PRN
Qty: 30 TABLET | Refills: 5 | Status: SHIPPED | OUTPATIENT
Start: 2024-11-15

## 2024-11-20 ENCOUNTER — PATIENT MESSAGE (OUTPATIENT)
Dept: PRIMARY CARE CLINIC | Facility: CLINIC | Age: 19
End: 2024-11-20

## 2024-11-24 RX ORDER — ONDANSETRON 8 MG/1
8 TABLET, ORALLY DISINTEGRATING ORAL EVERY 8 HOURS PRN
Qty: 30 TABLET | Refills: 1 | Status: SHIPPED | OUTPATIENT
Start: 2024-11-24

## 2024-12-16 RX ORDER — ONDANSETRON 8 MG/1
8 TABLET, ORALLY DISINTEGRATING ORAL EVERY 8 HOURS PRN
Qty: 30 TABLET | Refills: 1 | Status: SHIPPED | OUTPATIENT
Start: 2024-12-16

## 2025-01-12 RX ORDER — FERROUS SULFATE 325(65) MG
325 TABLET ORAL
Qty: 90 TABLET | Refills: 1 | Status: SHIPPED | OUTPATIENT
Start: 2025-01-12

## 2025-02-05 RX ORDER — ONDANSETRON 8 MG/1
8 TABLET, ORALLY DISINTEGRATING ORAL EVERY 8 HOURS PRN
Qty: 30 TABLET | Refills: 1 | Status: SHIPPED | OUTPATIENT
Start: 2025-02-05

## 2025-02-22 RX ORDER — ONDANSETRON 8 MG/1
8 TABLET, ORALLY DISINTEGRATING ORAL EVERY 8 HOURS PRN
Qty: 30 TABLET | Refills: 1 | Status: SHIPPED | OUTPATIENT
Start: 2025-02-22

## 2025-03-11 SDOH — ECONOMIC STABILITY: FOOD INSECURITY: WITHIN THE PAST 12 MONTHS, THE FOOD YOU BOUGHT JUST DIDN'T LAST AND YOU DIDN'T HAVE MONEY TO GET MORE.: NEVER TRUE

## 2025-03-11 SDOH — ECONOMIC STABILITY: INCOME INSECURITY: IN THE LAST 12 MONTHS, WAS THERE A TIME WHEN YOU WERE NOT ABLE TO PAY THE MORTGAGE OR RENT ON TIME?: NO

## 2025-03-11 SDOH — ECONOMIC STABILITY: FOOD INSECURITY: WITHIN THE PAST 12 MONTHS, YOU WORRIED THAT YOUR FOOD WOULD RUN OUT BEFORE YOU GOT MONEY TO BUY MORE.: NEVER TRUE

## 2025-03-11 SDOH — ECONOMIC STABILITY: TRANSPORTATION INSECURITY
IN THE PAST 12 MONTHS, HAS THE LACK OF TRANSPORTATION KEPT YOU FROM MEDICAL APPOINTMENTS OR FROM GETTING MEDICATIONS?: NO

## 2025-03-11 SDOH — ECONOMIC STABILITY: TRANSPORTATION INSECURITY
IN THE PAST 12 MONTHS, HAS LACK OF TRANSPORTATION KEPT YOU FROM MEETINGS, WORK, OR FROM GETTING THINGS NEEDED FOR DAILY LIVING?: NO

## 2025-03-11 ASSESSMENT — PATIENT HEALTH QUESTIONNAIRE - PHQ9
SUM OF ALL RESPONSES TO PHQ QUESTIONS 1-9: 2
1. LITTLE INTEREST OR PLEASURE IN DOING THINGS: SEVERAL DAYS
SUM OF ALL RESPONSES TO PHQ QUESTIONS 1-9: 2
2. FEELING DOWN, DEPRESSED OR HOPELESS: SEVERAL DAYS
SUM OF ALL RESPONSES TO PHQ QUESTIONS 1-9: 2
SUM OF ALL RESPONSES TO PHQ QUESTIONS 1-9: 2
SUM OF ALL RESPONSES TO PHQ9 QUESTIONS 1 & 2: 2
1. LITTLE INTEREST OR PLEASURE IN DOING THINGS: SEVERAL DAYS
2. FEELING DOWN, DEPRESSED OR HOPELESS: SEVERAL DAYS

## 2025-03-12 ENCOUNTER — OFFICE VISIT (OUTPATIENT)
Dept: PRIMARY CARE CLINIC | Facility: CLINIC | Age: 20
End: 2025-03-12
Payer: COMMERCIAL

## 2025-03-12 VITALS
TEMPERATURE: 99.1 F | SYSTOLIC BLOOD PRESSURE: 118 MMHG | BODY MASS INDEX: 24.86 KG/M2 | HEIGHT: 66 IN | DIASTOLIC BLOOD PRESSURE: 64 MMHG | OXYGEN SATURATION: 99 % | HEART RATE: 56 BPM

## 2025-03-12 DIAGNOSIS — R10.84 GENERALIZED ABDOMINAL PAIN: ICD-10-CM

## 2025-03-12 DIAGNOSIS — F41.9 ANXIETY: Primary | ICD-10-CM

## 2025-03-12 PROCEDURE — 99215 OFFICE O/P EST HI 40 MIN: CPT

## 2025-03-12 RX ORDER — OMEPRAZOLE 40 MG/1
CAPSULE, DELAYED RELEASE ORAL
COMMUNITY
Start: 2024-11-26

## 2025-03-12 RX ORDER — DICYCLOMINE HYDROCHLORIDE 10 MG/1
10 CAPSULE ORAL NIGHTLY
Qty: 30 CAPSULE | Refills: 0 | Status: SHIPPED | OUTPATIENT
Start: 2025-03-12 | End: 2025-03-13 | Stop reason: SDUPTHER

## 2025-03-12 RX ORDER — BUSPIRONE HYDROCHLORIDE 5 MG/1
5 TABLET ORAL 3 TIMES DAILY PRN
Qty: 90 TABLET | Refills: 0 | Status: SHIPPED | OUTPATIENT
Start: 2025-03-12

## 2025-03-12 ASSESSMENT — ENCOUNTER SYMPTOMS
EYES NEGATIVE: 1
ABDOMINAL PAIN: 1
SHORTNESS OF BREATH: 0
DIARRHEA: 1
CHEST TIGHTNESS: 0
COUGH: 0
ALLERGIC/IMMUNOLOGIC NEGATIVE: 1
NAUSEA: 0
VOMITING: 0
BLOOD IN STOOL: 0

## 2025-03-12 NOTE — PROGRESS NOTES
Normocephalic and atraumatic.   Eyes:      Pupils: Pupils are equal, round, and reactive to light.   Cardiovascular:      Rate and Rhythm: Normal rate and regular rhythm.      Pulses: Normal pulses.      Heart sounds: Normal heart sounds. No murmur heard.     No friction rub.   Pulmonary:      Effort: Pulmonary effort is normal. No respiratory distress.      Breath sounds: Normal breath sounds.   Chest:      Chest wall: No tenderness.   Abdominal:      General: Abdomen is flat.      Palpations: Abdomen is soft.      Tenderness: There is no abdominal tenderness. There is no right CVA tenderness, left CVA tenderness, guarding or rebound.   Musculoskeletal:         General: Normal range of motion.      Cervical back: Normal range of motion.   Skin:     General: Skin is warm and dry.      Coloration: Skin is not pale.   Neurological:      General: No focal deficit present.      Mental Status: She is alert and oriented to person, place, and time. Mental status is at baseline.   Psychiatric:         Mood and Affect: Mood normal.         Behavior: Behavior normal.         Thought Content: Thought content normal.         Judgment: Judgment normal.             RESULTS    No results found for this or any previous visit (from the past 144 hours).      JANNET Carvalho  6:12 PM  03/12/25

## 2025-03-12 NOTE — ASSESSMENT & PLAN NOTE
Chronic, worsening  - elavil caused dizziness  - propranolol not a good fit  - no longer following with [facility]  - states medications for adhd they tried her on caused side effects, like negative thoughts  - stopped wellbutrin  - exacerbated by great grandma passing in November & then her boyfriend trying to commit suicide recently  - is talking to a counselor at [facility]. Encouraged to continue  - has cut back on commitments, including quit her [employer] job  - denies suicidal or homicidal ideation or intent  - not sleeping well, poor appetite  - buspar tid prn prescribed. Advised to keep us updated  - advised to start multivitamin w/ d  - return if symptoms acutely worsen or continue to persist w/o improvement past treatment  - f/u routine 4/14/2025 with Dr. Berman or sooner for acute care needs

## 2025-03-12 NOTE — PATIENT INSTRUCTIONS
IT WAS GREAT TO SEE YOU TODAY!    WE WILL CONTACT YOU WITH THE RESULTS OF YOUR LABS.    PLEASE TAKE ALL MEDICATION AS DISCUSSED.   - BENTYL AT BEDTIME FOR EARLY MORNING ABDOMINAL PAIN  - BUSPAR UP TO THREE TIMES A DAY AS NEEDED FOR ANXIETY. KEEP IT ON YOU.   - START AN OVER THE COUNTER PROBIOTIC (ALIGN & CULTURELLE ARE GOOD BRANDS)  - START A DAILY MULTIVITAMIN ([employer]TR[employer] [facility] IS GOOD)    DRINK LOTS OF WATER. WEAR YOUR SEATBELT. FOCUS ON FRESH FRUITS AND VEGGIES DAILY, AND LEAN MEATS LIKE CHICKEN OR FISH. AVOID FRIED, FATTY FOODS, BREAD, PASTA, SALT AND \"ADDED\" SUGAR, ESPECIALLY JUICES. AVOID TOBACCO AND EXCESSIVE ALCOHOL. EXERCISE AT LEAST 5 TIMES A WEEK FOR AT LEAST 30 MINUTES AT A TIME. KEEP A CONSISTENT SLEEP SCHEDULE.     RETURN IF SYMPTOMS WORSEN OR DO NOT IMPROVE WITH TREATMENT.  KEEP ME UPDATED.    WE WILL SEE YOU AGAIN AT YOUR NEXT APPOINTMENT WITH DR. COOLEY 4/14/2025 BUT PLEASE SEND [employer] MESSAGE OR CALL WITH CONCERNS [phone]

## 2025-03-12 NOTE — ASSESSMENT & PLAN NOTE
Acute, unmanaged  - states different than her IBS  - onset stressful life events & anxiety exacerbation. Will treat anxiety w/ buspar tid prn.   - endorses poor appetite. Has slowly increased food intake. Encouraged to continue. Has tried zofran for the nausea associated with food, but states didn't help. Denies fever or chills. States has lost weight.   - describes pain as sharp & onset early morning, which wakes her up. Denies cramping or burning pain.   - has had diarrhea once daily, but in afternoons.   - bentyl at bedtime prescribed  - advised to start probiotic & daily multivitamin. States never picked up her iron. Advised to hold off on that for now. Last [facility]bc wnl.   - return if symptoms acutely worsen or continue to persist w/o improvement past treatment  - f/u routine 4/14/2025 with Dr. Berman or sooner for acute care needs

## 2025-03-13 DIAGNOSIS — R10.84 GENERALIZED ABDOMINAL PAIN: ICD-10-CM

## 2025-03-14 RX ORDER — DICYCLOMINE HYDROCHLORIDE 10 MG/1
10 CAPSULE ORAL NIGHTLY
Qty: 30 CAPSULE | Refills: 0 | Status: SHIPPED | OUTPATIENT
Start: 2025-03-14 | End: 2025-04-13

## 2025-03-18 DIAGNOSIS — F41.9 ANXIETY: Primary | ICD-10-CM

## 2025-03-18 RX ORDER — FLUOXETINE 10 MG/1
10 CAPSULE ORAL DAILY
Qty: 90 CAPSULE | Refills: 3 | Status: SHIPPED | OUTPATIENT
Start: 2025-03-18 | End: 2026-03-13

## 2025-03-19 RX ORDER — ONDANSETRON 8 MG/1
8 TABLET, ORALLY DISINTEGRATING ORAL EVERY 8 HOURS PRN
Qty: 30 TABLET | Refills: 1 | Status: SHIPPED | OUTPATIENT
Start: 2025-03-19

## 2025-04-08 RX ORDER — ONDANSETRON 8 MG/1
8 TABLET, ORALLY DISINTEGRATING ORAL EVERY 8 HOURS PRN
Qty: 30 TABLET | Refills: 1 | Status: SHIPPED | OUTPATIENT
Start: 2025-04-08

## 2025-04-14 ENCOUNTER — OFFICE VISIT (OUTPATIENT)
Dept: PRIMARY CARE CLINIC | Facility: CLINIC | Age: 20
End: 2025-04-14
Payer: COMMERCIAL

## 2025-04-14 VITALS
OXYGEN SATURATION: 99 % | SYSTOLIC BLOOD PRESSURE: 114 MMHG | DIASTOLIC BLOOD PRESSURE: 70 MMHG | BODY MASS INDEX: 23.46 KG/M2 | HEIGHT: 66 IN | TEMPERATURE: 98.3 F | HEART RATE: 63 BPM | WEIGHT: 146 LBS

## 2025-04-14 DIAGNOSIS — K58.2 IRRITABLE BOWEL SYNDROME WITH BOTH CONSTIPATION AND DIARRHEA: Primary | ICD-10-CM

## 2025-04-14 DIAGNOSIS — F41.9 ANXIETY: ICD-10-CM

## 2025-04-14 DIAGNOSIS — M62.838 MUSCLE SPASM: ICD-10-CM

## 2025-04-14 PROCEDURE — 99214 OFFICE O/P EST MOD 30 MIN: CPT | Performed by: FAMILY MEDICINE

## 2025-04-14 RX ORDER — HYDROXYZINE HYDROCHLORIDE 25 MG/1
25 TABLET, FILM COATED ORAL NIGHTLY PRN
Qty: 30 TABLET | Refills: 2 | Status: SHIPPED | OUTPATIENT
Start: 2025-04-14 | End: 2025-07-13

## 2025-04-14 RX ORDER — CITALOPRAM HYDROBROMIDE 10 MG/1
10 TABLET ORAL DAILY
Qty: 90 TABLET | Refills: 1 | Status: SHIPPED | OUTPATIENT
Start: 2025-04-14

## 2025-04-14 RX ORDER — FAMOTIDINE 40 MG/1
TABLET, FILM COATED ORAL
COMMUNITY
Start: 2025-04-07

## 2025-04-14 ASSESSMENT — ENCOUNTER SYMPTOMS
ABDOMINAL PAIN: 0
DIARRHEA: 0
VOMITING: 0
NAUSEA: 0
COUGH: 0
SHORTNESS OF BREATH: 0

## 2025-04-14 NOTE — ASSESSMENT & PLAN NOTE
New problem, went to the ER with negative workup, believed to be due to Prozac.  Stopped this 3 days ago, states that Hydroxyzine in the ER helped, so will send a refill.

## 2025-04-14 NOTE — PATIENT INSTRUCTIONS
IT WAS GREAT TO SEE YOU TODAY!    WE WILL SEE WHAT THE ENDOSCOPY SHOWS.    PLEASE TAKE ALL MEDICATION AS DISCUSSED.    ~TAKE THE CITALOPRAM DAILY TO HELP WITH ANXIETY.    ~USE HYDROXYZINE AS NEEDED FOR EITHER MUSCLE SPASMS, SLEEP OR ANXIETY.  IT CAN MAKE YOU SLEEPY.    I WILL SEE YOU AGAIN IN 3 MONTHS BUT PLEASE CALL WITH CONCERNS [phone]

## 2025-04-14 NOTE — ASSESSMENT & PLAN NOTE
Chronic, not at goal.  Stopped Prozac 3 days ago following an ER visit for muscle spasms.  Will prescribe Citalopram, as patient's mother is tolerating this well, and will add Hydroxyzine at night to help as needed.  Recheck in 3 months.

## 2025-04-14 NOTE — PROGRESS NOTES
[facility]  Yareli Berman, DO  [address], [address]  [place] [id number] [phone]         ASSESSMENT AND PLAN    Problem List Items Addressed This Visit          Digestive    Irritable bowel syndrome with both constipation and diarrhea - Primary    Chronic, seeing GI and scheduled for an endoscopy next month.           Relevant Medications    famotidine (PEPCID) 40 MG tablet       Other    Anxiety    Chronic, not at goal.  Stopped Prozac 3 days ago following an ER visit for muscle spasms.  Will prescribe Citalopram, as patient's mother is tolerating this well, and will add Hydroxyzine at night to help as needed.  Recheck in 3 months.         Relevant Medications    citalopram (CELEXA) 10 MG tablet    hydrOXYzine HCl (ATARAX) 25 MG tablet    Muscle spasm     New problem, went to the ER with negative workup, believed to be due to Prozac.  Stopped this 3 days ago, states that Hydroxyzine in the ER helped, so will send a refill.             The diagnoses and plan were discussed with the patient, who verbalizes understanding and agrees with plan.  All questions answered.    Chief Complaint    Chief Complaint   Patient presents with    6 Month Follow-Up         HISTORY OF PRESENT ILLNESS    19 y.o. female with IBS presents today for follow up.  Last seen by Teodora, the PA, for worsening anxiety and was started on Buspirone.  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  Had to go to the ER for muscle spasms 3 days ago, told it may be due to her Prozac.  Stopped this medicine, notes it wasn't helping anyway.  Has not had any muscle spasms since stopping the Prozac.  Notes multiple family members take Citalopram with no side effects.  Stopped the Buspirone as it was not helping.  Was able to take Hydroxyzine from the ER, which helped with her spasms.  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     PAST

## 2025-05-19 ENCOUNTER — PATIENT MESSAGE (OUTPATIENT)
Dept: PRIMARY CARE CLINIC | Facility: CLINIC | Age: 20
End: 2025-05-19

## 2025-05-30 RX ORDER — ONDANSETRON 8 MG/1
8 TABLET, ORALLY DISINTEGRATING ORAL EVERY 8 HOURS PRN
Qty: 30 TABLET | Refills: 1 | Status: SHIPPED | OUTPATIENT
Start: 2025-05-30

## 2025-07-08 RX ORDER — ONDANSETRON 8 MG/1
8 TABLET, ORALLY DISINTEGRATING ORAL EVERY 8 HOURS PRN
Qty: 30 TABLET | Refills: 1 | Status: SHIPPED | OUTPATIENT
Start: 2025-07-08

## 2025-07-16 ENCOUNTER — OFFICE VISIT (OUTPATIENT)
Dept: PRIMARY CARE CLINIC | Facility: CLINIC | Age: 20
End: 2025-07-16
Payer: COMMERCIAL

## 2025-07-16 VITALS
BODY MASS INDEX: 24.11 KG/M2 | DIASTOLIC BLOOD PRESSURE: 62 MMHG | OXYGEN SATURATION: 97 % | WEIGHT: 150 LBS | SYSTOLIC BLOOD PRESSURE: 118 MMHG | HEART RATE: 67 BPM | HEIGHT: 66 IN

## 2025-07-16 DIAGNOSIS — K58.2 IRRITABLE BOWEL SYNDROME WITH BOTH CONSTIPATION AND DIARRHEA: ICD-10-CM

## 2025-07-16 DIAGNOSIS — F41.9 ANXIETY: Primary | ICD-10-CM

## 2025-07-16 PROCEDURE — 99214 OFFICE O/P EST MOD 30 MIN: CPT | Performed by: FAMILY MEDICINE

## 2025-07-16 RX ORDER — CITALOPRAM HYDROBROMIDE 20 MG/1
20 TABLET ORAL DAILY
Qty: 90 TABLET | Refills: 1 | Status: SHIPPED | OUTPATIENT
Start: 2025-07-16

## 2025-07-16 RX ORDER — HYDROXYZINE HYDROCHLORIDE 25 MG/1
25 TABLET, FILM COATED ORAL 3 TIMES DAILY PRN
COMMUNITY

## 2025-07-16 ASSESSMENT — PATIENT HEALTH QUESTIONNAIRE - PHQ9
2. FEELING DOWN, DEPRESSED OR HOPELESS: NOT AT ALL
1. LITTLE INTEREST OR PLEASURE IN DOING THINGS: NOT AT ALL
SUM OF ALL RESPONSES TO PHQ QUESTIONS 1-9: 0

## 2025-07-16 ASSESSMENT — ENCOUNTER SYMPTOMS
SHORTNESS OF BREATH: 0
NAUSEA: 0
ABDOMINAL PAIN: 0
VOMITING: 0
COUGH: 0
DIARRHEA: 0

## 2025-07-16 NOTE — ASSESSMENT & PLAN NOTE
Chronic, improved on Citalopram but does think it could improve.  Will increase the dose to 20 mg.

## 2025-07-16 NOTE — PROGRESS NOTES
[facility]  Yareli Valenzuela Antonella, DO  [address], [address]  [place] [id number] [phone]         ASSESSMENT AND PLAN    Problem List Items Addressed This Visit          Digestive    Irritable bowel syndrome with both constipation and diarrhea    Chronic, normal endoscopy per GI a few months ago.  States that her stomach currently feels ok.  Will continue to monitor.            Other    Anxiety - Primary    Chronic, improved on Citalopram but does think it could improve.  Will increase the dose to 20 mg.         Relevant Medications    hydrOXYzine HCl (ATARAX) 25 MG tablet    citalopram (CELEXA) 20 MG tablet        The diagnoses and plan were discussed with the patient, who verbalizes understanding and agrees with plan.  All questions answered.    Chief Complaint    Chief Complaint   Patient presents with    Abdominal Pain    Anxiety         HISTORY OF PRESENT ILLNESS    19 y.o. female with IBS presents today for follow up.  Last seen three months ago, was waiting for an endoscopy and was not doing well with her anxiety so started on Citalopram and Hydroxyzine to help with anxiety and muscle spasms that the ER attributed to the previous prescription for Prozac.  Endoscopy was normal and biopsies showed only reactive gastropathy.  States that she currently is feeling well in terms of her IBS.  Started serving at [employer].  Starts back at [employer] this fall, taking two [employer] classes, Human A&[employer] and sociology.      PAST MEDICAL HISTORY    Past Medical History:   Diagnosis Date    Irritable bowel syndrome        PAST SURGICAL HISTORY    History reviewed. No pertinent surgical history.    FAMILY HISTORY    History reviewed. No pertinent family history.    SOCIAL HISTORY    Social History     Socioeconomic History    Marital status: Single     Spouse name: None    Number of children: None    Years of education: None    Highest education level: None   Tobacco Use

## 2025-07-16 NOTE — ASSESSMENT & PLAN NOTE
Chronic, normal endoscopy per GI a few months ago.  States that her stomach currently feels ok.  Will continue to monitor.

## 2025-07-16 NOTE — PATIENT INSTRUCTIONS
IT WAS GREAT TO SEE YOU TODAY!    GOOD LUCK WITH YOUR INTERNSHIP AND SCHOOL!    PLEASE TAKE ALL MEDICATION AS DISCUSSED.    ~WE ARE INCREASING CITALOPRAM TO 20 MG DAILY.    I WILL SEE YOU AGAIN IN 5-6 MONTHS BUT PLEASE CALL WITH CONCERNS [phone]

## 2025-08-15 RX ORDER — ONDANSETRON 8 MG/1
8 TABLET, ORALLY DISINTEGRATING ORAL EVERY 8 HOURS PRN
Qty: 30 TABLET | Refills: 5 | Status: SHIPPED | OUTPATIENT
Start: 2025-08-15